# Patient Record
Sex: FEMALE | Race: WHITE | NOT HISPANIC OR LATINO | Employment: OTHER | URBAN - NONMETROPOLITAN AREA
[De-identification: names, ages, dates, MRNs, and addresses within clinical notes are randomized per-mention and may not be internally consistent; named-entity substitution may affect disease eponyms.]

---

## 2019-01-31 ENCOUNTER — HOSPITAL ENCOUNTER (OUTPATIENT)
Dept: BONE DENSITY | Facility: HOSPITAL | Age: 64
End: 2019-01-31

## 2019-02-25 ENCOUNTER — HOSPITAL ENCOUNTER (OUTPATIENT)
Dept: BONE DENSITY | Facility: HOSPITAL | Age: 64
Discharge: HOME | End: 2019-02-25
Admitting: INTERNAL MEDICINE

## 2019-02-25 DIAGNOSIS — Z78.0 ASYMPTOMATIC MENOPAUSAL STATE: ICD-10-CM

## 2019-02-25 PROCEDURE — 77080 DXA BONE DENSITY AXIAL: CPT | Performed by: RADIOLOGY

## 2019-02-25 PROCEDURE — 77080 DXA BONE DENSITY AXIAL: CPT

## 2019-08-26 ENCOUNTER — OFFICE VISIT (OUTPATIENT)
Dept: PSYCHIATRY | Facility: CLINIC | Age: 64
End: 2019-08-26

## 2019-08-26 DIAGNOSIS — F41.1 GENERALIZED ANXIETY DISORDER: ICD-10-CM

## 2019-08-26 DIAGNOSIS — F33.1 MAJOR DEPRESSIVE DISORDER, RECURRENT EPISODE, MODERATE (HCC): Primary | ICD-10-CM

## 2019-08-26 PROCEDURE — 90791 PSYCH DIAGNOSTIC EVALUATION: CPT | Performed by: SOCIAL WORKER
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
## 2019-09-18 ENCOUNTER — OFFICE VISIT (OUTPATIENT)
Dept: PSYCHIATRY | Facility: CLINIC | Age: 64
End: 2019-09-18

## 2019-09-18 DIAGNOSIS — F41.1 GENERALIZED ANXIETY DISORDER: ICD-10-CM

## 2019-09-18 DIAGNOSIS — F33.1 MAJOR DEPRESSIVE DISORDER, RECURRENT EPISODE, MODERATE (HCC): Primary | ICD-10-CM

## 2019-09-18 PROCEDURE — 90837 PSYTX W PT 60 MINUTES: CPT | Performed by: SOCIAL WORKER

## 2019-09-18 NOTE — PROGRESS NOTES
"    PROGRESS NOTE  Data:  Anita Gorman came in 9/18/2019 for her regularly scheduled therapy session, with Carla Merritt, \[facility]\""[facility] from 445 pm to 540.  Patient shares that her mood was mostly positive since last being seen until the last week when patient admits she became much more focused on her 's behaviors.  She is shares several specifics of questionable behavior that he is doing.  The patient shares that she has been exploring interactions types and she has decided that she is the problem in the relationship.  Patient was tearful while she shared her emotional hurt and pain of her 's dishonesty.  Shares that Patient also shares that  has an appointment to see the psychiatrist Dr. Orona and she is hopeful that this will be better for her.    Clinical maneuvering/Intervention:  Assisted patient in processing above session content; acknowledged and normalized patient’s thoughts, feelings, and concerns about herself and her marrage. Allowed patient to freely discuss issues without interruption or judgment. Provided safe, confidential environment to facilitate the development of positive therapeutic relationship and encourage open, honest communication. Exploring with the patient what makes her think that it is her fear of abandonment that is creating all the problems in the marriage.  Assisted the patient to list what are the facts.  As she completes this she realizes that she is not overreacting and she does feel like people who she loved left her.  Again using what are the facts as she challenges the negative thoughts.  She admits that while it is true her parents left her isidro in life, her Uncle passed away and did not intentionally leave her.  She shares that she \"should/Shouldnt\".... As her main thinking issue.  Praised patient for increased insight.  Continued to use CBT to assist her in challenging the negative thought patterns.  Assisted the patient to make a plan " for what she can start doing to help herself feel better-focusing on herself.  She is encouraged to do these things, bible study, [employer], women's fellowship etc.  Encouraged pt the importance of keeping all appointments and taking medications as prescribed if prescribed  and calling with any questions or concerns.    Assisted patient in identifying risk factors which would indicate the need for higher level of care including thoughts to harm self or others and/or self-harming behavior and encouraged patient to contact this office, call 911, or present to the nearest emergency room should any of these events occur. Discussed crisis intervention services and means to access.  Patient adamantly and convincingly denies current suicidal or homicidal ideation or perceptual disturbance.    Assessment     Diagnoses and all orders for this visit:    Major depressive disorder, recurrent episode, moderate (CMS/HCC)    Generalized anxiety disorder        Patient presents for session on time, clean and casually dressed with depressed/anxious mood and congruent affect. No evidence of intoxication, withdrawal, or perceptual disturbance. Patient appears to maintain relative stability as compared to their baseline.  However, patient continues to struggle with anxiety/depression with extreme, harsh negative self talk which continues to cause impairment in important areas of functioning.  A result, they can be reasonably expected to continue to benefit from treatment and would likely be at increased risk for decompensation otherwise Association’s intact, abstraction intact. Thought process is linear and logical. Speech is clear and coherent. Patient is oriented to person, place, and time. Attention and concentration fair. Insight and judgment fair. Patient reports no current suicidal or homicidal ideation. Patient appears cooperative and agreeable to treatment and appears to begin to develop rapport. Patient does not appear to be  malingering.          Psychological ROS: positive for - anxiety, depression and obsessive thoughts    Mental Status Exam:   Hygiene:   good  Cooperation:  Cooperative  Eye Contact:  Good  Psychomotor Behavior:  Restless  Affect:  Appropriate  Mood: anxious  Speech:  Normal  Thought Process:  Linear  Thought Content:  Normal  Suicidal:  None  Homicidal:  None  Hallucinations:  None  Delusion:  None  Memory:  Intact  Orientation:  Person, Place, Time and Situation  Reliability:  good  Insight:  Good  Judgement:  Fair  Impulse Control:  Good  Physical/Medical Issues:  No      Patient's Support Network Includes:  , daughter and extended family    Progress toward goal: Not at goal    Functional Status: Moderate impairment     Prognosis: Good with Ongoing Treatment          Future Appointments       Provider Department Center    9/23/2019 10:00 AM Zeina Ortiz APRN [facility]     10/29/2019 2:00 PM Carla Meadows LCSW [facility]     11/13/2019 2:00 PM Carla Meadows LCSW [facility]           Patient will have at least monthly outpatient psychotherapy sessions or earlier if symptoms worsen or fail to improve and pharmacotherapy as scheduled with the focus on improved functioning and coping skills, maintaining stability and avoiding decompensation and the need for a higher level of care.      Patient will adhere to medication regimen as prescribed and report any side effects. Patient will contact this office, call 911 or present to the nearest emergency room should suicidal or homicidal ideations occur. Provide Cognitive Behavioral Therapy and Solution Focused Therapy to improve functioning, maintain stability, and avoid decompensation and the need for higher level of care.     Carla Merritt LCSW, [facility]

## 2019-09-19 NOTE — TREATMENT PLAN
Multi-Disciplinary Problems (from Behavioral Health Treatment Plan)    Active Problems     Problem: Anxiety  Start Date: 09/19/19    Problem Details:  The patient self-scales this problem as a 6 with 10 being the worst.       Goal Priority Start Date Expected End Date End Date    Patient will develop and implement behavioral and cognitive strategies to reduce anxiety and irrational fears. -- 09/19/19 09/18/20 --    Goal Details:  Progress toward goal:  Not appropriate to rate progress toward goal since this is the initial treatment plan.       Goal Intervention Frequency Start Date End Date    Help patient explore past emotional issues in relation to present anxiety. PRN 09/19/19 --    Intervention Details:  Duration of treatment until until discharged.       Goal Intervention Frequency Start Date End Date    Help patient develop an awareness of their cognitive and physical responses to anxiety. PRN 09/19/19 --    Intervention Details:  Duration of treatment until until remission of symptoms.             Problem: Co-Dependency  Start Date: 09/19/19    Problem Details:  The patient self-scales this problem as a 8 with 10 being the worst.       Goal Priority Start Date Expected End Date End Date    Patient will demonstrate ability to set healthy boundaries and meet own needs within a relationship. -- 09/19/19 09/18/20 --    Goal Details:  Progress toward goal:  Not appropriate to rate progress toward goal since this is the initial treatment plan.       Goal Intervention Frequency Start Date End Date    Assist patient in identifying enabling behaviors and healthy boundaries within relationships. PRN 09/19/19 --    Intervention Details:  Duration of treatment until until discharged.             Problem: Depression  Start Date: 09/19/19    Problem Details:  The patient self-scales this problem as a 4 with 10 being the worst.       Goal Priority Start Date Expected End Date End Date    Patient will demonstrate the ability  to initiate new constructive life skills outside of sessions on a consistent basis. -- 09/19/19 09/18/20 --    Goal Details:  Progress toward goal:  Not appropriate to rate progress toward goal since this is the initial treatment plan.       Goal Intervention Frequency Start Date End Date    Assist patient in setting attainable activities of daily living goals. PRN 09/19/19 --    Goal Intervention Frequency Start Date End Date    Provide education about depression PRN 09/19/19 --    Intervention Details:  Duration of treatment until until remission of symptoms.       Goal Intervention Frequency Start Date End Date    Assist patient in developing healthy coping strategies. PRN 09/19/19 --    Intervention Details:  Duration of treatment until until remission of symptoms.                          I have discussed and reviewed this treatment plan with the patient.

## 2019-09-23 ENCOUNTER — OFFICE VISIT (OUTPATIENT)
Dept: PSYCHIATRY | Facility: CLINIC | Age: 64
End: 2019-09-23

## 2019-09-23 VITALS
DIASTOLIC BLOOD PRESSURE: 72 MMHG | BODY MASS INDEX: 27.59 KG/M2 | WEIGHT: 165.6 LBS | HEART RATE: 82 BPM | HEIGHT: 65 IN | SYSTOLIC BLOOD PRESSURE: 132 MMHG

## 2019-09-23 DIAGNOSIS — F41.1 GENERALIZED ANXIETY DISORDER: ICD-10-CM

## 2019-09-23 DIAGNOSIS — Z79.899 MEDICATION MANAGEMENT: ICD-10-CM

## 2019-09-23 DIAGNOSIS — F33.1 MAJOR DEPRESSIVE DISORDER, RECURRENT EPISODE, MODERATE (HCC): Primary | ICD-10-CM

## 2019-09-23 DIAGNOSIS — F51.05 INSOMNIA DUE TO OTHER MENTAL DISORDER: ICD-10-CM

## 2019-09-23 DIAGNOSIS — F99 INSOMNIA DUE TO OTHER MENTAL DISORDER: ICD-10-CM

## 2019-09-23 LAB
AMPHETAMINE CUT-OFF: NORMAL
BENZODIAZIPINE CUT-OFF: NORMAL
BUPRENORPHINE CUT-OFF: NORMAL
COCAINE CUT-OFF: NORMAL
EXTERNAL AMPHETAMINE SCREEN URINE: NEGATIVE
EXTERNAL BENZODIAZEPINE SCREEN URINE: NEGATIVE
EXTERNAL BUPRENORPHINE SCREEN URINE: NEGATIVE
EXTERNAL COCAINE SCREEN URINE: NEGATIVE
EXTERNAL MDMA: NEGATIVE
EXTERNAL METHADONE SCREEN URINE: NEGATIVE
EXTERNAL METHAMPHETAMINE SCREEN URINE: NEGATIVE
EXTERNAL OPIATES SCREEN URINE: NEGATIVE
EXTERNAL OXYCODONE SCREEN URINE: NEGATIVE
EXTERNAL THC SCREEN URINE: NEGATIVE
MDMA CUT-OFF: NORMAL
METHADONE CUT-OFF: NORMAL
METHAMPHETAMINE CUT-OFF: NORMAL
OPIATES CUT-OFF: NORMAL
OXYCODONE CUT-OFF: NORMAL
THC CUT-OFF: NORMAL

## 2019-09-23 PROCEDURE — 90792 PSYCH DIAG EVAL W/MED SRVCS: CPT | Performed by: NURSE PRACTITIONER

## 2019-09-23 RX ORDER — FLUOXETINE 10 MG/1
10 CAPSULE ORAL DAILY
Qty: 7 CAPSULE | Refills: 0 | Status: SHIPPED | OUTPATIENT
Start: 2019-09-23 | End: 2019-09-30

## 2019-09-23 RX ORDER — MONTELUKAST SODIUM 10 MG/1
TABLET ORAL
COMMUNITY
Start: 2019-07-03

## 2019-09-23 RX ORDER — CHOLECALCIFEROL (VITAMIN D3) 125 MCG
5 CAPSULE ORAL
COMMUNITY

## 2019-09-23 RX ORDER — DOCUSATE CALCIUM 240 MG
240 CAPSULE ORAL 2 TIMES DAILY
COMMUNITY

## 2019-09-23 RX ORDER — ROSUVASTATIN CALCIUM 40 MG/1
TABLET, COATED ORAL
COMMUNITY
Start: 2019-08-07

## 2019-09-23 RX ORDER — ESTRADIOL 0.05 MG/D
FILM, EXTENDED RELEASE TRANSDERMAL
COMMUNITY
Start: 2019-09-21

## 2019-09-23 RX ORDER — FLUOXETINE HYDROCHLORIDE 20 MG/1
20 CAPSULE ORAL DAILY
Qty: 23 CAPSULE | Refills: 0 | Status: SHIPPED | OUTPATIENT
Start: 2019-09-23 | End: 2019-10-09 | Stop reason: SDUPTHER

## 2019-09-23 RX ORDER — DICLOFENAC SODIUM 75 MG/1
TABLET, DELAYED RELEASE ORAL
COMMUNITY
Start: 2019-08-10 | End: 2019-11-06

## 2019-09-23 RX ORDER — TRAZODONE HYDROCHLORIDE 50 MG/1
25-50 TABLET ORAL NIGHTLY PRN
Qty: 30 TABLET | Refills: 0 | Status: SHIPPED | OUTPATIENT
Start: 2019-09-23 | End: 2019-10-09 | Stop reason: SDUPTHER

## 2019-09-23 NOTE — PROGRESS NOTES
Subjective   Anita Gorman is a 64 y.o. female who is here today seeing me for the first time for medication management after being referred by Carla DEGROOT here at the [facility].     Chief Complaint:  Anxiety and depression    History of Present Illness  Patient comes in today seeing me for the first time after being referred by Carla DEGROOT due to anxiety and depression. Patient reports that she has been suffering from anxiety and depression on and off for the last 8 years and relates most of this to her  and his infidelity.  Patient reports that she has frequent mood changes along with irritability and agitation.  Patient reports that she stays angry as where he will as fearful that incidents will occur again.  Patient reports that she used to cry when she would get upset and anymore she is staying angry and irritable.  Patient does report racing thoughts that mostly occur at nighttime.  Patient states that in the last 8 years she has not been sleeping well as she states she is tried melatonin 5 mg and has been on for several years now and it helps roughly 5 out of the 7 nights a week.  Patient states that even with the melatonin she is only averaging 5 hours but does not feel rested.  Patient states that other times she will not be able to go to sleep where she will be up and down throughout the night and have difficulty sleeping and they did take naps throughout the day.  Patient reports that her appetite is up and down based upon her mood.  She states that she is currently on weight watchers now as she is 13 pounds heavier than what she prefers.  Patient states that she was seeing a counselor in the past but due to her insurance change she had to stop seeing the counselor for several years and is now back to seeing Carla.  Patient reports that she is more forgetful and more tired lately than she has been previously.  Patient states that she has saw her PCP Dr. Richards in the past and believes  "that she has been on sertraline as well as Escitalopram but cannot be for certain.  Patient did not recall being on Pristiq or Viibryd.  Patient had a hard time recalling if anything was effective for her.  Patient also began crying as she stated that due to her spiritual believes she feels guilty over not being able to overcome these thoughts spiritually and feels guilty with taking medication. The patient reports depressive symptoms including depressed mood, insomnia, decreased appetite, feelings of guilt, feelings of worthlessness and low energy, and have caused impairment in important areas of functioning.  Depression rated 8/10 with 10 being the worst. The patient reports the following symptoms of anxiety: constant anxiety/worry, restlessness/on edge, difficulty concentrating, irritability, sleep disturbance and anxiety causes distress/impairment in important areas of functioning and have caused impairment in important areas of functioning.  She currently rates her anxiety as 7-8 on a scale of 0-10 with 10 being the worst.  Patient reports that she cannot seem to shake the \"what if\" factor in her head which is mostly related to her .  She reports that counseling has been good for her but is still having the ongoing anxiety and depression along with the mood changes.  Patient adamantly denies any SI or HI or any past hospitalization or attempts.  Patient denies any auditory visual hallucinations.      The following portions of the patient's history were reviewed and updated as appropriate: allergies, current medications, past family history, past medical history, past social history, past surgical history and problem list.    Review of Systems   Psychiatric/Behavioral: Positive for agitation, dysphoric mood and sleep disturbance. The patient is nervous/anxious.    All other systems reviewed and are negative.      Objective   Physical Exam   Constitutional: She appears well-developed and well-nourished. " "  Psychiatric: Her speech is normal. Judgment and thought content normal. Her mood appears anxious. She is agitated. Cognition and memory are normal. She exhibits a depressed mood.   Nursing note and vitals reviewed.    Blood pressure 132/72, pulse 82, height 165.1 cm (65\"), weight 75.1 kg (165 lb 9.6 oz). Body mass index is 27.56 kg/m².      No Known Allergies    Recent Results (from the past 2016 hour(s))   [facility] Drug Screen    Collection Time: 09/23/19  9:41 AM   Result Value Ref Range    External Amphetamine Screen Urine Negative     Amphetamine Cut-Off 1000mg/ml     External Benzodiazepine Screen Urine Negative     Benzodiazipine Cut-Off 300mg/ml     External Cocaine Screen Urine Negative     Cocaine Cut-Off 300mg/ml     External THC Screen Urine Negative     THC Cut-Off 50mg/ml     External Methadone Screen Urine Negative     Methadone Cut-Off 300mg/ml     External Methamphetamine Screen Urine Negative     Methamphetamine Cut-Off 1000mg/ml     External Oxycodone Screen Urine Negative     Oxycodone Cut-Off 100mg/ml     External Buprenorphine Screen Urine Negative     Buprenorphine Cut-Off 10mg/ml     External MDMA Negative     MDMA Cut-Off 500mg/ml     External Opiates Screen Urine Negative     Opiates Cut-Off 300mg/ml        Current Medications:   Current Outpatient Medications   Medication Sig Dispense Refill   • diclofenac (VOLTAREN) 75 MG EC tablet      • docusate calcium (SURFAK) 240 MG capsule Take 240 mg by mouth 2 (Two) Times a Day.     • esomeprazole (nexIUM) 20 MG capsule      • estradiol (MINIVELLE, VIVELLE-DOT) 0.05 MG/24HR patch      • melatonin 5 MG tablet tablet Take 5 mg by mouth.     • montelukast (SINGULAIR) 10 MG tablet      • rosuvastatin (CRESTOR) 40 MG tablet      • FLUoxetine (PROzac) 10 MG capsule Take 1 capsule by mouth Daily for 7 days. 7 capsule 0   • FLUoxetine (PROzac) 20 MG capsule Take 1 capsule by mouth Daily for 23 days. 23 capsule 0   • traZODone (DESYREL) 50 MG tablet Take " 0.5-1 tablets by mouth At Night As Needed for Sleep. 30 tablet 0     No current facility-administered medications for this visit.        Mental Status Exam:   Hygiene:   good  Cooperation:  Cooperative  Eye Contact:  Good  Psychomotor Behavior:  Appropriate  Affect:  Appropriate  Hopelessness: Denies  Speech:  Normal  Thought Process:  Goal directed and Linear  Thought Content:  Normal  Suicidal:  None  Homicidal:  None  Hallucinations:  None  Delusion:  None  Memory:  Intact  Orientation:  Person, Place, Time and Situation  Reliability:  good  Insight:  Fair  Judgement:  Good  Impulse Control:  Good  Physical/Medical Issues:  Yes Menopause    Assessment/Plan   Diagnoses and all orders for this visit:    Major depressive disorder, recurrent episode, moderate (CMS/HCC)  -     FLUoxetine (PROzac) 10 MG capsule; Take 1 capsule by mouth Daily for 7 days.  -     FLUoxetine (PROzac) 20 MG capsule; Take 1 capsule by mouth Daily for 23 days.    Generalized anxiety disorder  -     FLUoxetine (PROzac) 10 MG capsule; Take 1 capsule by mouth Daily for 7 days.  -     FLUoxetine (PROzac) 20 MG capsule; Take 1 capsule by mouth Daily for 23 days.    Insomnia due to other mental disorder  -     traZODone (DESYREL) 50 MG tablet; Take 0.5-1 tablets by mouth At Night As Needed for Sleep.    Medication management  -     [facility] Drug Screen        Discused medications options in detail as well as past psychiatric and medical history as well as past social history; also discussed past medications as well as any side effects and current symptoms as the patient is new to me seeing me for the first time for medication management..      Begin Prozac 10 mg for 1 week if no side effects then increase to 20 mg daily for depression and anxiety.  Begin trazodone 25-50 mg at night as needed for sleep.  Patient was hesitant and fearful of medication explained the side effects as well as risk and benefits to the patient in detail.  Informed patient  that we would start with a low dose to ensure no side effects or if side effects were to occur to contact the [facility] immediately stop the Prozac and begin another SSRI.  Also informed the patient to not take the trazodone after 10 PM as it can cause daytime drowsiness and fatigue and if it were to continue to do so contact the [facility] as we would try an alternative for sleep.  Discussed the risks, beneefits, and side effects of the medications; patient ackowledged and verbally consentedd.  Patient is aware to call the [facility] with any worsening of symptoms.  Patient is agreeable to call 911 or go to the nearest ER should he/she begin having SI/HI.  Patient was informed that if she had any worsening symptoms of depression and anxiety or frequent mood changes with any SI to immediately stop Prozac contact the [facility] or go to the ER patient agreed.  Patient states that she will be on a cruise in a few weeks so will follow-up in 3 weeks instead to assess any side effects and effectiveness of medication.  Highly encouraged the patient to continue with her therapy for coping skills related to her  and her depression and anxiety with Carla here at the [facility].         Errors in dictation may reflect use of voice recognition software and not all errors in transcription may have been detected prior to signing.

## 2019-10-09 ENCOUNTER — OFFICE VISIT (OUTPATIENT)
Dept: PSYCHIATRY | Facility: CLINIC | Age: 64
End: 2019-10-09

## 2019-10-09 VITALS
BODY MASS INDEX: 27.56 KG/M2 | DIASTOLIC BLOOD PRESSURE: 69 MMHG | HEART RATE: 71 BPM | SYSTOLIC BLOOD PRESSURE: 119 MMHG | HEIGHT: 65 IN

## 2019-10-09 DIAGNOSIS — F41.1 GENERALIZED ANXIETY DISORDER: ICD-10-CM

## 2019-10-09 DIAGNOSIS — F99 INSOMNIA DUE TO OTHER MENTAL DISORDER: ICD-10-CM

## 2019-10-09 DIAGNOSIS — F51.05 INSOMNIA DUE TO OTHER MENTAL DISORDER: ICD-10-CM

## 2019-10-09 DIAGNOSIS — F33.1 MAJOR DEPRESSIVE DISORDER, RECURRENT EPISODE, MODERATE (HCC): Primary | ICD-10-CM

## 2019-10-09 PROCEDURE — 99213 OFFICE O/P EST LOW 20 MIN: CPT | Performed by: NURSE PRACTITIONER

## 2019-10-09 RX ORDER — TRAZODONE HYDROCHLORIDE 50 MG/1
25-50 TABLET ORAL NIGHTLY PRN
Qty: 30 TABLET | Refills: 0 | Status: SHIPPED | OUTPATIENT
Start: 2019-10-09 | End: 2019-11-06 | Stop reason: SDUPTHER

## 2019-10-09 RX ORDER — FLUOXETINE HYDROCHLORIDE 40 MG/1
40 CAPSULE ORAL DAILY
Qty: 30 CAPSULE | Refills: 0 | Status: SHIPPED | OUTPATIENT
Start: 2019-10-09 | End: 2019-11-06 | Stop reason: SDUPTHER

## 2019-10-09 NOTE — PROGRESS NOTES
Subjective   Anita Gorman is a 64 y.o. female who is here today for medication management follow up.    Chief Complaint:  Follow-up for depression and anxiety     History of Present Illness  Patient presents today noting that going to argument with her  last Wednesday over his sexual dysfunction as well as his related to drug addiction which he is in denial of.  Patient states that she started having increased anxiety over the situation since last Wednesday when this occurred.  Patient feels that her anxiety and depression is more situational related to her  due to his addictions.  Patient stated that she has not noticed any difference with the medications that she is still get having anger and negativity towards herself and others.  Patient reports her anxiety and depression are an 8 out of 10 on a scale of 0-10 with 10 being the worst.  Patient feels that the situation also brought more of her depression and anxiety on.  Patient reports though that the trazodone has been helpful as it helps her get to sleep and calms her down as she stays asleep and is getting roughly 7-8 hours at night.  Patient reports that her appetite is good.  Any side effects to the current medications.  She had stated that she noticed some red in her stool this morning but states she has had spaghetti 2 days in a row encouraged her to continue monitoring and if it increases to contact her PCP.  Patient feels as though if her mood is up and down and she is angry but is most related towards her  and situational.  Patient reports that her anxiety can be situational at times.  Patient notes that sometimes she wants to cry but cannot which also makes it worse for her.  Patient did state that she was looking at her records and noticed that Dr. Richards had put her on paroxetine and sertraline in the past but she was not sure if it was beneficial or not as it has been sometime ago.  Patient denied any side effects to the  "current medication but states she has not noticed any difference but it has not been a full 4 weeks yet since the patient goes on a cruise next week.  She adamantly denies any SI or HI.  Patient denies any auditory or visual hallucinations.        The following portions of the patient's history were reviewed and updated as appropriate: allergies, current medications, past family history, past medical history, past social history, past surgical history and problem list.    Review of Systems   Psychiatric/Behavioral: Positive for agitation and dysphoric mood. The patient is nervous/anxious.    All other systems reviewed and are negative.      Objective   Physical Exam   Constitutional: She appears well-developed and well-nourished.   Psychiatric: Her speech is normal. Judgment and thought content normal. Her mood appears anxious. She is agitated. Cognition and memory are normal. She exhibits a depressed mood.   Nursing note and vitals reviewed.    Blood pressure 119/69, pulse 71, height 165.1 cm (65\").    No Known Allergies    Recent Results (from the past 2016 hour(s))   [facility] Drug Screen    Collection Time: 09/23/19  9:41 AM   Result Value Ref Range    External Amphetamine Screen Urine Negative     Amphetamine Cut-Off 1000mg/ml     External Benzodiazepine Screen Urine Negative     Benzodiazipine Cut-Off 300mg/ml     External Cocaine Screen Urine Negative     Cocaine Cut-Off 300mg/ml     External THC Screen Urine Negative     THC Cut-Off 50mg/ml     External Methadone Screen Urine Negative     Methadone Cut-Off 300mg/ml     External Methamphetamine Screen Urine Negative     Methamphetamine Cut-Off 1000mg/ml     External Oxycodone Screen Urine Negative     Oxycodone Cut-Off 100mg/ml     External Buprenorphine Screen Urine Negative     Buprenorphine Cut-Off 10mg/ml     External MDMA Negative     MDMA Cut-Off 500mg/ml     External Opiates Screen Urine Negative     Opiates Cut-Off 300mg/ml        Current Medications: "   Current Outpatient Medications   Medication Sig Dispense Refill   • diclofenac (VOLTAREN) 75 MG EC tablet      • docusate calcium (SURFAK) 240 MG capsule Take 240 mg by mouth 2 (Two) Times a Day.     • esomeprazole (nexIUM) 20 MG capsule      • estradiol (MINIVELLE, VIVELLE-DOT) 0.05 MG/24HR patch      • FLUoxetine (PROzac) 40 MG capsule Take 1 capsule by mouth Daily for 30 days. 30 capsule 0   • melatonin 5 MG tablet tablet Take 5 mg by mouth.     • montelukast (SINGULAIR) 10 MG tablet      • rosuvastatin (CRESTOR) 40 MG tablet      • traZODone (DESYREL) 50 MG tablet Take 0.5-1 tablets by mouth At Night As Needed for Sleep. 30 tablet 0     No current facility-administered medications for this visit.        Mental Status Exam:   Hygiene:   good  Cooperation:  Cooperative  Eye Contact:  Good  Psychomotor Behavior:  Appropriate  Affect:  Appropriate  Hopelessness: Denies  Speech:  Normal  Thought Process:  Goal directed  Thought Content:  Normal  Suicidal:  None  Homicidal:  None  Hallucinations:  None  Delusion:  None  Memory:  Intact  Orientation:  Person, Place, Time and Situation  Reliability:  good  Insight:  Fair  Judgement:  Good  Impulse Control:  Good  Physical/Medical Issues:  No     Assessment/Plan   Diagnoses and all orders for this visit:    Major depressive disorder, recurrent episode, moderate (CMS/HCC)  -     FLUoxetine (PROzac) 40 MG capsule; Take 1 capsule by mouth Daily for 30 days.    Insomnia due to other mental disorder  -     traZODone (DESYREL) 50 MG tablet; Take 0.5-1 tablets by mouth At Night As Needed for Sleep.    Generalized anxiety disorder  -     FLUoxetine (PROzac) 40 MG capsule; Take 1 capsule by mouth Daily for 30 days.        Discused medications options as well as any side effects.    Patient denies any side effects to the Prozac so will increase to 40 mg daily for ongoing depression and anxiety.  Encourage patient that if she had any worsening symptoms of depression or anxiety to  immediately stop the Prozac and contact the [place] [facility] patient verbally agreed.  Continue trazodone 25-50 mg at night as needed for sleep as the patient reports it has been beneficial.  Encourage the patient at the next visit if she has not noticed any difference with the medication and will switch to another SSRI or SNRI. Discussed the risks, beneefits, and side effects of the medications; patient ackowledged and verbally consentedd.  Patient is aware to call the [facility] with any worsening of symptoms.  Patient is agreeable to call 911 or go to the nearest ER should he/she begin having SI/HI.    Prognosis: Guarded dependent on medication/follow up and treatment plan compliance.  Functionality: pt having significant impairment in important areas of daily functioning.  Patient will follow-up in 4 weeks highly encouraged patient that if she had any worsening symptoms to contact the [facility] for sooner appointment patient verbally agreed.  Encouraged patient to keep appointments with Carla DEGROOT here at the [facility] to work on coping skills as well as anxiety and depression.      Errors in dictation may reflect use of voice recognition software and not all errors in transcription may have been detected prior to signing.

## 2019-10-29 ENCOUNTER — OFFICE VISIT (OUTPATIENT)
Dept: PSYCHIATRY | Facility: CLINIC | Age: 64
End: 2019-10-29

## 2019-10-29 DIAGNOSIS — F33.1 MAJOR DEPRESSIVE DISORDER, RECURRENT EPISODE, MODERATE (HCC): Primary | ICD-10-CM

## 2019-10-29 DIAGNOSIS — F41.1 GENERALIZED ANXIETY DISORDER: ICD-10-CM

## 2019-10-29 PROCEDURE — 90834 PSYTX W PT 45 MINUTES: CPT | Performed by: SOCIAL WORKER

## 2019-10-29 NOTE — PROGRESS NOTES
"    PROGRESS NOTE  Data:  Anita Gorman came in 10/29/2019 for her regularly scheduled therapy session, with Carla Merritt, LCSWLCADSHAUN from 1:50 pm to 2:45 pm.  Pt. Reports that she is just returning from a 2 [place] vacation in [place]/  She shares that she is frustrated and angry at current situation but reports that she is not as depressed as before medications.  Shares at length about relationship and what she is realizing about herself.    Clinical Maneuvering/Intervention:  Assisted patient in processing above session content; acknowledged and normalized patient’s thoughts, feelings, and concerns. Allowed patient to freely discuss issues without interruption or judgment. Provided safe, confidential environment to facilitate the development of positive therapeutic relationship and encourage open, honest communication.   Continued to discuss 7 foundations of mindfulness and how to practice these skills to improve her self satisfaction.  Continued to work on focusing on herself and her own self growth right now and using \"Acceptance\" of what is so that she can continue to grow and learn ways to feel better.  Using CBT to continue to challenge thought distortions.  Encouraged pt the importance of keeping all appointments and taking medications as prescribed if prescribed  and calling with any questions or concerns.    Assisted patient in identifying risk factors which would indicate the need for higher level of care including thoughts to harm self or others and/or self-harming behavior and encouraged patient to contact this office, call 911, or present to the nearest emergency room should any of these events occur. Discussed crisis intervention services and means to access.  Patient adamantly and convincingly denies current suicidal or homicidal ideation or perceptual disturbance.    Assessment     Diagnoses and all orders for this visit:    Major depressive disorder, recurrent episode, moderate " (CMS/[facility])    Generalized anxiety disorder        Patient presents for session on time, clean and casually dressed with depressed/anxious mood and congruent affect. No evidence of intoxication, withdrawal, or perceptual disturbance. Patient appears to maintain relative stability as compared to their baseline.  However, patient continues to struggle with depression and anxiety which continues to cause impairment in important areas of functioning.  A result, they can be reasonably expected to continue to benefit from treatment and would likely be at increased risk for decompensation otherwise. Association’s intact, abstraction intact. Thought process is linear and logical. Speech is clear and coherent. Patient is oriented to person, place, and time. Attention and concentration fair. Insight and judgment fair. Patient reports no current suicidal or homicidal ideation. Patient appears cooperative and agreeable to treatment and appears to begin to develop rapport. Patient does not appear to be malingering.          Psychological ROS: positive for - anxiety and depression    Mental Status Exam:   Hygiene:   good  Cooperation:  Cooperative  Eye Contact:  Good  Psychomotor Behavior:  Restless  Affect:  Full range  Mood: euthymic  Speech:  Normal  Thought Process:  Goal directed and Linear  Thought Content:  Normal  Suicidal:  None  Homicidal:  None  Hallucinations:  None  Delusion:  None  Memory:  Intact  Orientation:  Person, Place, Time and Situation  Reliability:  good  Insight:  Fair  Judgement:  Fair  Impulse Control:  Good  Physical/Medical Issues:  No           Patient's Support Network Includes:  , daughter and extended family    Progress toward goal: Not at goal    Functional Status: Moderate impairment     Prognosis: Good with Ongoing Treatment          Future Appointments       Provider Department Center    11/6/2019 12:30 PM Zeina Ortiz APRN [facility]      11/13/2019 2:00 PM Carla Meadows LCSW [facility]     11/26/2019 10:00 AM Carla Meadows LCSW [facility]             Patient will have at least monthly outpatient psychotherapy sessions or earlier if symptoms worsen or fail to improve and pharmacotherapy as scheduled with the focus on improved functioning and coping skills, maintaining stability and avoiding decompensation and the need for a higher level of care.      Patient will adhere to medication regimen as prescribed and report any side effects. Patient will contact this office, call 911 or present to the nearest emergency room should suicidal or homicidal ideations occur. Provide Cognitive Behavioral Therapy and Solution Focused Therapy to improve functioning, maintain stability, and avoid decompensation and the need for higher level of care.     Carla Merritt LCSW,[facility]

## 2019-11-06 ENCOUNTER — OFFICE VISIT (OUTPATIENT)
Dept: PSYCHIATRY | Facility: CLINIC | Age: 64
End: 2019-11-06

## 2019-11-06 VITALS
WEIGHT: 166.6 LBS | SYSTOLIC BLOOD PRESSURE: 119 MMHG | HEIGHT: 65 IN | DIASTOLIC BLOOD PRESSURE: 70 MMHG | HEART RATE: 73 BPM | BODY MASS INDEX: 27.76 KG/M2

## 2019-11-06 DIAGNOSIS — F51.05 INSOMNIA DUE TO OTHER MENTAL DISORDER: ICD-10-CM

## 2019-11-06 DIAGNOSIS — F41.1 GENERALIZED ANXIETY DISORDER: ICD-10-CM

## 2019-11-06 DIAGNOSIS — F99 INSOMNIA DUE TO OTHER MENTAL DISORDER: ICD-10-CM

## 2019-11-06 DIAGNOSIS — F33.1 MAJOR DEPRESSIVE DISORDER, RECURRENT EPISODE, MODERATE (HCC): ICD-10-CM

## 2019-11-06 PROCEDURE — 99213 OFFICE O/P EST LOW 20 MIN: CPT | Performed by: NURSE PRACTITIONER

## 2019-11-06 RX ORDER — IBUPROFEN 600 MG/1
600 TABLET ORAL DAILY
COMMUNITY

## 2019-11-06 RX ORDER — TRAZODONE HYDROCHLORIDE 50 MG/1
25-50 TABLET ORAL NIGHTLY PRN
Qty: 90 TABLET | Refills: 0 | Status: SHIPPED | OUTPATIENT
Start: 2019-11-06 | End: 2020-01-29 | Stop reason: SDUPTHER

## 2019-11-06 RX ORDER — FLUOXETINE HYDROCHLORIDE 40 MG/1
40 CAPSULE ORAL DAILY
Qty: 90 CAPSULE | Refills: 0 | Status: SHIPPED | OUTPATIENT
Start: 2019-11-06 | End: 2020-01-29 | Stop reason: SDUPTHER

## 2019-11-06 NOTE — PROGRESS NOTES
Subjective   Anita Gorman is a 64 y.o. female who is here today for medication management follow up.    Chief Complaint:  Follow-up for depression and anxiety     History of Present Illness   Patient comes in today noting that she has been doing much better since the increase in Prozac.  Patient states that her mood has been better and she is not as anxious and depressed.  Patient states that she was able to go home on her cruise to the [place] and had a wonderful time without any difficulty or concern.  Patient stated that things are going better with her  as they are able to communicate more and things have not been as stressful.  Patient states that she has had a couple of days where she has been upset or felt anxious but was able to overcome it without any serious symptoms and manage well.  Patient currently rates her depression and anxiety a 4-5 on a scale of 0-10 with 10 being the worst.  Patient states that that severe anxiety feeling has gone away significantly for her as she is only had 2 episodes and they have not been as significant.  Patient reports that she is sleeping well getting roughly 7-8 hours of sleep at night.  Patient states that she does not always take the trazodone as she may take melatonin and sleep just well and other nights where she has difficulty she takes 25 mg and does well with her sleep.  Patient states that she is currently back on weight watchers and may be doing Adipex and was wondering about the combination.  Instructed the patient that to watch for her mood in any worsening depression and anxiety but she states she only takes for a short times to help with the weight.  Patient denied any side effects to the current medications.  Patient states that overall things have been going well and she looks to continue therapy and the medication and then wants to taper off.  Instructed patient to at least try for 1 year but we can reevaluate in 6-8 months at tapering if  "she is doing well managing her symptoms.  Patient denied any SI or HI.  Patient denies any auditory visual hallucinations.      The following portions of the patient's history were reviewed and updated as appropriate: allergies, current medications, past family history, past medical history, past social history, past surgical history and problem list.    Review of Systems   Psychiatric/Behavioral: Positive for dysphoric mood. Negative for agitation. The patient is nervous/anxious.        Objective   Physical Exam   Constitutional: She appears well-developed and well-nourished.   Psychiatric: Her speech is normal and behavior is normal. Judgment and thought content normal. Her mood appears anxious. She is not agitated. Cognition and memory are normal. She does not exhibit a depressed mood.   Nursing note and vitals reviewed.    Blood pressure 119/70, pulse 73, height 165.1 cm (65\"), weight 75.6 kg (166 lb 9.6 oz).    No Known Allergies    Recent Results (from the past 2016 hour(s))   [employer] Drug Screen    Collection Time: 09/23/19  9:41 AM   Result Value Ref Range    External Amphetamine Screen Urine Negative     Amphetamine Cut-Off 1000mg/ml     External Benzodiazepine Screen Urine Negative     Benzodiazipine Cut-Off 300mg/ml     External Cocaine Screen Urine Negative     Cocaine Cut-Off 300mg/ml     External THC Screen Urine Negative     THC Cut-Off 50mg/ml     External Methadone Screen Urine Negative     Methadone Cut-Off 300mg/ml     External Methamphetamine Screen Urine Negative     Methamphetamine Cut-Off 1000mg/ml     External Oxycodone Screen Urine Negative     Oxycodone Cut-Off 100mg/ml     External Buprenorphine Screen Urine Negative     Buprenorphine Cut-Off 10mg/ml     External MDMA Negative     MDMA Cut-Off 500mg/ml     External Opiates Screen Urine Negative     Opiates Cut-Off 300mg/ml        Current Medications:   Current Outpatient Medications   Medication Sig Dispense Refill   • docusate calcium " (SURFAK) 240 MG capsule Take 240 mg by mouth 2 (Two) Times a Day.     • esomeprazole (nexIUM) 20 MG capsule      • estradiol (MINIVELLE, VIVELLE-DOT) 0.05 MG/24HR patch      • FLUoxetine (PROzac) 40 MG capsule Take 1 capsule by mouth Daily for 90 days. 90 capsule 0   • ibuprofen (ADVIL,MOTRIN) 600 MG tablet Take 600 mg by mouth Daily.     • melatonin 5 MG tablet tablet Take 5 mg by mouth.     • montelukast (SINGULAIR) 10 MG tablet      • rosuvastatin (CRESTOR) 40 MG tablet      • traZODone (DESYREL) 50 MG tablet Take 1/2 to 1 tablet by mouth At Night As Needed for Sleep. 90 tablet 0     No current facility-administered medications for this visit.        Mental Status Exam:   Hygiene:   good  Cooperation:  Cooperative  Eye Contact:  Good  Psychomotor Behavior:  Appropriate  Affect:  Appropriate  Hopelessness: Denies  Speech:  Normal  Thought Process:  Goal directed  Thought Content:  Normal  Suicidal:  None  Homicidal:  None  Hallucinations:  None  Delusion:  None  Memory:  Intact  Orientation:  Person, Place, Time and Situation  Reliability:  good  Insight:  Fair  Judgement:  Good  Impulse Control:  Good  Physical/Medical Issues:  No     Assessment/Plan   Diagnoses and all orders for this visit:    Insomnia due to other mental disorder  -     traZODone (DESYREL) 50 MG tablet; Take 1/2 to 1 tablet by mouth At Night As Needed for Sleep.    Major depressive disorder, recurrent episode, moderate (CMS/HCC)  -     FLUoxetine (PROzac) 40 MG capsule; Take 1 capsule by mouth Daily for 90 days.    Generalized anxiety disorder  -     FLUoxetine (PROzac) 40 MG capsule; Take 1 capsule by mouth Daily for 90 days.        Discused medications options as well as any side effects.      Continue fluoxetine 40 mg daily for depression and anxiety symptoms as patient reports has been helpful without any side effects.  Continue trazodone 25-50 mg at night as needed for sleep as the patient reports it has been beneficial. Discussed the  risks, beneefits, and side effects of the medications; patient ackowledged and verbally consentedd.  Patient is aware to call the [facility] with any worsening of symptoms.  Patient is agreeable to call 911 or go to the nearest ER should he/she begin having SI/HI.  Discussed with the patient in detail regarding tapering and coming off this medication as she states she is eventually wants to.  Instructed the patient that at least 1 year would be better beneficial for the patient to help with the chemicals in the brain but we would reevaluate in 6 months if she was doing well and continue with therapy and had good coping skills regarding tapering and then discontinuing the fluoxetine patient was agreeable with this plan.  Encourage patient to continue following up with Carla for therapy as well as positive coping skills.    Prognosis: Guarded dependent on medication/follow up and treatment plan compliance.  Functionality: pt showing improvements in important areas of daily functioning regarding depression and anxiety and will continue to be compliant and go to therapy over the next 3 months.   Patient will follow-up in 3 months since she feels stable on her current medication regimen.  Highly encouraged patient that if she had any questions or concerns to contact the [facility] or schedule sooner appointment patient agreed.      Errors in dictation may reflect use of voice recognition software and not all errors in transcription may have been detected prior to signing.

## 2019-11-26 ENCOUNTER — OFFICE VISIT (OUTPATIENT)
Dept: PSYCHIATRY | Facility: CLINIC | Age: 64
End: 2019-11-26

## 2019-11-26 DIAGNOSIS — F41.1 GENERALIZED ANXIETY DISORDER: ICD-10-CM

## 2019-11-26 DIAGNOSIS — F33.1 MAJOR DEPRESSIVE DISORDER, RECURRENT EPISODE, MODERATE (HCC): Primary | ICD-10-CM

## 2019-11-26 PROCEDURE — 90837 PSYTX W PT 60 MINUTES: CPT | Performed by: SOCIAL WORKER

## 2019-11-26 NOTE — PROGRESS NOTES
"    PROGRESS NOTE  Data:  Anita Gorman came in 11/26/2019 for her regularly scheduled therapy session, with Carla Merritt, MIKAYLA, LUIS from 9:55 am to 10:55 am.  Patient shares positive changes in her behavior thoughts and feelings since she has continued to refocus her energies on things that she enjoys.  She has begun being active in [employer] study as well as the book \"it is not supposed to be this way\".  Patient shares that she finds herself remaining angry and frustrated and marital relationship and is working hard to resolve those feelings.  She reports that she is working out regularly and has begun attending [employer] which she enjoys.  Patient shares that she stopped taking trazodone due to constipation and is using melatonin with good results for sleep.  She shares the Prozac has improved her mood but she is unable to cry.  Clinical Maneuvering/Intervention:  Assisted patient in processing above session content; acknowledged and normalized patient’s thoughts, feelings, and concerns. Allowed patient to freely discuss issues without interruption or judgment. Provided safe, confidential environment to facilitate the development of positive therapeutic relationship and encourage open, honest communication. Reviewed information about anxiety and depression.   To new to work with the patient addressing negative self talk significant issues with should statements and personalization and blame Continued to address the patient's \"should treatments\" and \"personalization and blame\".  Discussed patient's anger and resentment and using mindfulness to help manage these intense emotions.  Continue to assist patient in applying adaptive strategies to her cognitive distortions.  Patient reports that she is doing better and is pleased with her progress  Encouraged pt the importance of keeping all appointments and taking medications as prescribed if prescribed  and calling with any questions or " concerns.  Assisted patient in identifying risk factors which would indicate the need for higher level of care including thoughts to harm self or others and/or self-harming behavior and encouraged patient to contact this office, call 911, or present to the nearest emergency room should any of these events occur. Discussed crisis intervention services and means to access.  Patient adamantly and convincingly denies current suicidal or homicidal ideation or perceptual disturbance.    Assessment     Diagnoses and all orders for this visit:    Major depressive disorder, recurrent episode, moderate (CMS/HCC)    Generalized anxiety disorder        Patient presents for session on time, clean and casually dressed with depressed/anxious mood and congruent affect. No evidence of intoxication, withdrawal, or perceptual disturbance. Patient appears to maintain relative stability as compared to their baseline.  However, patient continues to struggle with negative self talk which triggers anger, frustrations and anxiety which continues to cause impairment in important areas of functioning.  A result, they can be reasonably expected to continue to benefit from treatment and would likely be at increased risk for decompensation otherwise. Association’s intact, abstraction intact. Thought process is linear and logical. Speech is clear and coherent. Patient is oriented to person, place, and time. Attention and concentration fair. Insight and judgment fair. Patient reports no current suicidal or homicidal ideation. Patient appears cooperative and agreeable to treatment and appears to begin to develop rapport. Patient does not appear to be malingering.          Psychological ROS: positive for - anxiety, depression and irritability    Mental Status Exam:   Hygiene:   good  Cooperation:  Cooperative  Eye Contact:  Good  Psychomotor Behavior:  Appropriate  Affect:  Appropriate  Mood: euthymic  Speech:  Normal  Thought Process:   Linear  Thought Content:  Normal  Suicidal:  None  Homicidal:  None  Hallucinations:  None  Delusion:  None  Memory:  Intact  Orientation:  Person, Place, Time and Situation  Reliability:  fair  Insight:  Fair  Judgement:  Fair  Impulse Control:  Good  Physical/Medical Issues:  No  acute findings.      Patient's Support Network Includes:  , daughter and extended family    Progress toward goal: Not at goal    Functional Status: Moderate impairment     Prognosis: Good with Ongoing Treatment        Future Appointments       Provider Department Center    12/11/2019 11:00 AM Carla Meadows LCSW [facility]     1/29/2020 10:30 AM Zeina Ortiz APRN [facility]           No Follow-up on file.    Patient will have at least monthly outpatient psychotherapy sessions or earlier if symptoms worsen or fail to improve and pharmacotherapy as scheduled with the focus on improved functioning and coping skills, maintaining stability and avoiding decompensation and the need for a higher level of care.  Patient will adhere to medication regimen as prescribed and report any side effects. Patient will contact this office, call 911 or present to the nearest emergency room should suicidal or homicidal ideations occur. Provide Cognitive Behavioral Therapy and Solution Focused Therapy to improve functioning, maintain stability, and avoid decompensation and the need for higher level of care.     Carla Merritt LCSW,MAIK

## 2019-12-11 ENCOUNTER — OFFICE VISIT (OUTPATIENT)
Dept: PSYCHIATRY | Facility: CLINIC | Age: 64
End: 2019-12-11

## 2019-12-11 DIAGNOSIS — F41.1 GENERALIZED ANXIETY DISORDER: ICD-10-CM

## 2019-12-11 DIAGNOSIS — F33.1 MAJOR DEPRESSIVE DISORDER, RECURRENT EPISODE, MODERATE (HCC): Primary | ICD-10-CM

## 2019-12-11 PROCEDURE — 90837 PSYTX W PT 60 MINUTES: CPT | Performed by: SOCIAL WORKER

## 2019-12-11 NOTE — TREATMENT PLAN
Multi-Disciplinary Problems (from Behavioral Health Treatment Plan)    Active Problems     Problem: Anxiety  Start Date: 12/11/19    Problem Details:  The patient self-scales this problem as a 5 with 10 being the worst.       Goal Priority Start Date Expected End Date End Date    Patient will develop and implement behavioral and cognitive strategies to reduce anxiety and irrational fears. -- 12/11/19 12/11/20 --    Goal Details:  Progress toward goal:  The patient self-scales their progress related to this goal as a 5 with 10 being the worst.       Goal Intervention Frequency Start Date End Date    Help patient explore past emotional issues in relation to present anxiety. PRN 12/11/19 12/11/20    Intervention Details:  Duration of treatment until until remission of symptoms.       Goal Intervention Frequency Start Date End Date    Help patient develop an awareness of their cognitive and physical responses to anxiety. PRN 12/11/19 12/11/20    Intervention Details:  Duration of treatment until until remission of symptoms.             Problem: Co-Dependency  Start Date: 12/11/19    Problem Details:  The patient self-scales this problem as a 7 with 10 being the worst.       Goal Priority Start Date Expected End Date End Date    Patient will demonstrate ability to set healthy boundaries and meet own needs within a relationship. -- 12/11/19 12/11/20 --    Goal Details:  Progress toward goal:  The patient self-scales their progress related to this goal as a 7 with 10 being the worst.       Goal Intervention Frequency Start Date End Date    Assist patient in identifying enabling behaviors and healthy boundaries within relationships. PRN 12/11/19 12/11/20    Intervention Details:  Duration of treatment until until remission of symptoms.             Problem: Depression  Start Date: 12/11/19    Problem Details:  The patient self-scales this problem as a 3 with 10 being the worst.       Goal Priority Start Date Expected End  Date End Date    Patient will demonstrate the ability to initiate new constructive life skills outside of sessions on a consistent basis. -- 12/11/19 12/11/20 --    Goal Details:  Progress toward goal:  The patient self-scales their progress related to this goal as a 3 with 10 being the worst.       Goal Intervention Frequency Start Date End Date    Assist patient in setting attainable activities of daily living goals. PRN 12/11/19 --    Goal Intervention Frequency Start Date End Date    Provide education about depression PRN 12/11/19 12/11/20    Intervention Details:  Duration of treatment until until remission of symptoms.       Goal Intervention Frequency Start Date End Date    Assist patient in developing healthy coping strategies. PRN 12/11/19 12/11/20    Intervention Details:  Duration of treatment until until remission of symptoms.                          I have discussed and reviewed this treatment plan with the patient.

## 2019-12-11 NOTE — PLAN OF CARE
"Patient continues to address anxiety, codependency and depression.  She has begun taking medication which she thinks is helpful.  She has begun to \"step back\" from husbands behaviors.  She has also begun to challenge her negative self talk with beginning success.  Will continue to use CBT, distress tolerance skills, solution focused therapy to assist her in decreasing self scales of emotional distress.  Will use this plan when seen until the time when her symptoms resolve.  Patient is agreeable to the plan.  "

## 2019-12-11 NOTE — PROGRESS NOTES
"  PROGRESS NOTE  Data:  Anita Gorman came in 12/11/2019 for her regularly scheduled therapy session, with Carla Merritt, MIKAYLA,LUIS from 10:55 am to 11:50 am. Patient shares that last week she was difficult where she found herself ruminating about past hurts by .  Patient shares that she \"dosent trust people\".        Clinical Maneuvering/Intervention:  Assisted patient in processing above session content; acknowledged and normalized patient’s thoughts, feelings, and concerns. Allowed patient to freely discuss issues without interruption or judgment. Provided safe, confidential environment to facilitate the development of positive therapeutic relationship and encourage open, honest communication. Introduced 7 foundations of mindfulness  Encouraged pt the importance of keeping all appointments and taking medications as prescribed if prescribed  and calling with any questions or concerns.    Assisted patient in identifying risk factors which would indicate the need for higher level of care including thoughts to harm self or others and/or self-harming behavior and encouraged patient to contact this office, call 911, or present to the nearest emergency room should any of these events occur. Discussed crisis intervention services and means to access.  Patient adamantly and convincingly denies current suicidal or homicidal ideation or perceptual disturbance.  DEPRESSION SYMPTOMS: depressed mood  feelings of worthlessness/guilt  difficulty concentrating  Anxiety-loss of energy, irritability, excessive worry      Assessment     Diagnoses and all orders for this visit:    Major depressive disorder, recurrent episode, moderate (CMS/HCC)    Generalized anxiety disorder        Patient presents for session on time, clean and casually dressed with depressed/anxious mood and congruent affect. No evidence of intoxication, withdrawal, or perceptual disturbance. Patient appears to maintain relative stability " as compared to their baseline.  However, patient continues to struggle with negative self talk and rumination of things that happened in the past which continues to cause impairment in important areas of functioning.  A result, they can be reasonably expected to continue to benefit from treatment and would likely be at increased risk for decompensation otherwise. Association’s intact, abstraction intact. Thought process is linear and logical. Speech is clear and coherent. Patient is oriented to person, place, and time. Attention and concentration fair. Insight and judgment fair. Patient reports no current suicidal or homicidal ideation. Patient appears cooperative and agreeable to treatment and appears to begin to develop rapport. Patient does not appear to be malingering.        anxiety, depression and feeling anxious    Mental Status Exam:   Hygiene:   good  Cooperation:  Cooperative  Eye Contact:  Fair  Psychomotor Behavior:  Appropriate  Affect:  Appropriate  Mood: anxious  Speech:  Normal  Thought Process:  Linear  Thought Content:  Normal  Suicidal:  None  Homicidal:  None  Hallucinations:  None  Delusion:  None  Memory:  Intact  Orientation:  Person, Place, Time and Situation  Reliability:  good  Insight:  Fair  Judgement:  Fair  Impulse Control:  Good  Physical/Medical Issues:  No        Patient's Support Network Includes:  , daughter and extended family    Progress toward goal: Not at goal    Functional Status: Moderate impairment     Prognosis: Good with Ongoing Treatment          No follow-ups on file.    Patient will have at least monthly outpatient psychotherapy sessions or earlier if symptoms worsen or fail to improve and pharmacotherapy as scheduled with the focus on improved functioning and coping skills, maintaining stability and avoiding decompensation and the need for a higher level of care.      Patient will adhere to medication regimen as prescribed and report any side effects. Patient  will contact this office, call 911 or present to the nearest emergency room should suicidal or homicidal ideations occur. Provide Cognitive Behavioral Therapy and Solution Focused Therapy to improve functioning, maintain stability, and avoid decompensation and the need for higher level of care.     Carla Merritt, MIKAYLA,[facility]

## 2020-01-14 ENCOUNTER — OFFICE VISIT (OUTPATIENT)
Dept: PSYCHIATRY | Facility: CLINIC | Age: 65
End: 2020-01-14

## 2020-01-14 DIAGNOSIS — F33.1 MAJOR DEPRESSIVE DISORDER, RECURRENT EPISODE, MODERATE (HCC): Primary | ICD-10-CM

## 2020-01-14 DIAGNOSIS — F41.1 GENERALIZED ANXIETY DISORDER: ICD-10-CM

## 2020-01-14 DIAGNOSIS — F43.10 POST TRAUMATIC STRESS DISORDER (PTSD): ICD-10-CM

## 2020-01-14 PROCEDURE — 90837 PSYTX W PT 60 MINUTES: CPT | Performed by: SOCIAL WORKER

## 2020-01-14 NOTE — PROGRESS NOTES
PROGRESS NOTE  Data:  Anita Gorman came in 1/14/2020 for her regularly scheduled therapy session, with Carla Merritt, MIKAYLA,LUIS from 2:14 am to 3:15 pm.  Patient shares that after the last session she spent weeks agonzing over what to say when she went to the appt with her .  He said no.  She shared that she did not like his sexual deviancy, lust issues,  Disrespect.  Chair she spent a long time thinking about what she wanted to share with .  She shares that she is seeing a change in his behavior and remains hopeful that he will be able to continue making positive progress.    Clinical Maneuvering/Intervention:  Assisted patient in processing above session content; acknowledged and normalized patient’s thoughts, feelings, and concerns. Allowed patient to freely discuss issues without interruption or judgment. Provided safe, confidential environment to facilitate the development of positive therapeutic relationship and encourage open, honest communication.  Continued to discuss issues of codependency and how this exacerbates anxiety and depression patient shares more insight into how focusing on  makes her own recovery more difficult.  Discussed mindfulness techniques and how to apply continuing processing patient's intense emotions.  Patient to continue Bible study and explore online Al-Anon meetings which she is agreeable to look at.  Also suggested the book on boundaries. Encouraged pt the importance of keeping all appointments and taking medications as prescribed if prescribed  and calling with any questions or concerns.  Assisted patient in identifying risk factors which would indicate the need for higher level of care including thoughts to harm self or others and/or self-harming behavior and encouraged patient to contact this office, call 911, or present to the nearest emergency room should any of these events occur. Discussed crisis intervention services and means to  access.  Patient adamantly and convincingly denies current suicidal or homicidal ideation or perceptual disturbance.    Assessment     Diagnoses and all orders for this visit:    Major depressive disorder, recurrent episode, moderate (CMS/HCC)    Post traumatic stress disorder (PTSD)    Generalized anxiety disorder        Patient presents for session on time, clean and casually dressed with depressed/anxious mood and congruent affect. No evidence of intoxication, withdrawal, or perceptual disturbance. Patient appears to maintain relative stability as compared to their baseline.  However, patient continues to struggle with anxiety and depression  which continues to cause impairment in important areas of functioning.  A result, they can be reasonably expected to continue to benefit from treatment and would likely be at increased risk for decompensation otherwise. Association’s intact, abstraction intact. Thought process is linear and logical. Speech is clear and coherent. Patient is oriented to person, place, and time. Attention and concentration fair. Insight and judgment fair. Patient reports no current suicidal or homicidal ideation. Patient appears cooperative and agreeable to treatment and appears to begin to develop rapport. Patient does not appear to be malingering.        ROS: Patient is positive for anxiety, depression, excessive stress and marital problems    Mental Status Exam:   Hygiene:   good  Cooperation:  Cooperative  Eye Contact:  Good  Psychomotor Behavior:  Appropriate  Affect:  Appropriate  Mood: euthymic  Speech:  Normal  Thought Process:  Linear  Thought Content:  Normal  Suicidal:  None  Homicidal:  None  Hallucinations:  None  Delusion:  None  Memory:  Intact  Orientation:  Person, Place, Time and Situation  Reliability:  good  Insight:  Fair  Judgement:  Fair  Impulse Control:  Good  Physical/Medical Issues:  No        Patient's Support Network Includes:  , daughter and extended  family    Progress toward goal: Not at goal    Functional Status: Moderate impairment     Prognosis: Good with Ongoing Treatment          Future Appointments       Provider Department Center    1/29/2020 10:30 AM Zeina Ortiz APRN [facility]     2/11/2020 1:00 PM Carla Meadows LCSW [facility]           Patient will have at least monthly outpatient psychotherapy sessions or earlier if symptoms worsen or fail to improve and pharmacotherapy as scheduled with the focus on improved functioning and coping skills, maintaining stability and avoiding decompensation and the need for a higher level of care.      Patient will adhere to medication regimen as prescribed and report any side effects. Patient will contact this office, call 911 or present to the nearest emergency room should suicidal or homicidal ideations occur. Provide Cognitive Behavioral Therapy and Solution Focused Therapy to improve functioning, maintain stability, and avoid decompensation and the need for higher level of care.     Carla Merritt LCSW, [facility]

## 2020-01-29 ENCOUNTER — OFFICE VISIT (OUTPATIENT)
Dept: PSYCHIATRY | Facility: CLINIC | Age: 65
End: 2020-01-29

## 2020-01-29 VITALS
HEART RATE: 80 BPM | WEIGHT: 167 LBS | BODY MASS INDEX: 27.82 KG/M2 | HEIGHT: 65 IN | SYSTOLIC BLOOD PRESSURE: 139 MMHG | DIASTOLIC BLOOD PRESSURE: 84 MMHG

## 2020-01-29 DIAGNOSIS — F41.1 GENERALIZED ANXIETY DISORDER: ICD-10-CM

## 2020-01-29 DIAGNOSIS — F99 INSOMNIA DUE TO OTHER MENTAL DISORDER: ICD-10-CM

## 2020-01-29 DIAGNOSIS — F51.05 INSOMNIA DUE TO OTHER MENTAL DISORDER: ICD-10-CM

## 2020-01-29 DIAGNOSIS — F33.1 MAJOR DEPRESSIVE DISORDER, RECURRENT EPISODE, MODERATE (HCC): ICD-10-CM

## 2020-01-29 PROCEDURE — 90833 PSYTX W PT W E/M 30 MIN: CPT | Performed by: NURSE PRACTITIONER

## 2020-01-29 PROCEDURE — 99214 OFFICE O/P EST MOD 30 MIN: CPT | Performed by: NURSE PRACTITIONER

## 2020-01-29 RX ORDER — SULFACETAMIDE/PREDNISOLONE 10 %-0.2 %
SUSPENSION, DROPS(FINAL DOSAGE FORM)(ML) OPHTHALMIC (EYE)
COMMUNITY

## 2020-01-29 RX ORDER — FLUOROMETHOLONE 0.1 %
SUSPENSION, DROPS(FINAL DOSAGE FORM)(ML) OPHTHALMIC (EYE)
COMMUNITY
Start: 2019-12-17

## 2020-01-29 RX ORDER — FLUOXETINE HYDROCHLORIDE 40 MG/1
40 CAPSULE ORAL DAILY
Qty: 90 CAPSULE | Refills: 0 | Status: SHIPPED | OUTPATIENT
Start: 2020-01-29 | End: 2020-02-19 | Stop reason: SDUPTHER

## 2020-01-29 RX ORDER — LIDOCAINE HYDROCHLORIDE 20 MG/ML
JELLY TOPICAL
COMMUNITY

## 2020-01-29 RX ORDER — TRAZODONE HYDROCHLORIDE 50 MG/1
25-50 TABLET ORAL NIGHTLY PRN
Qty: 90 TABLET | Refills: 0 | Status: SHIPPED | OUTPATIENT
Start: 2020-01-29 | End: 2020-05-11 | Stop reason: SDUPTHER

## 2020-01-29 RX ORDER — BUPROPION HYDROCHLORIDE 150 MG/1
150 TABLET, EXTENDED RELEASE ORAL 2 TIMES DAILY
Qty: 60 TABLET | Refills: 0 | Status: SHIPPED | OUTPATIENT
Start: 2020-01-29 | End: 2020-02-19 | Stop reason: SDUPTHER

## 2020-01-29 NOTE — PROGRESS NOTES
Subjective   Anita Gorman is a 65 y.o. female who is here today for medication management follow up.    Chief Complaint:  Follow-up for depression and anxiety     History of Present Illness   Patient comes in today noting that she has had a difficult time as her  was recently inpatient for a GI bleed and diagnosed with diverticulosis flareup.  Patient reports that she has had difficulty with sleep because of this but otherwise her sleep has been good as she is getting at least 6 to 7 hours.  Patient states on nights when she has trouble sleeping she will take a quarter of a trazodone and that helps with her sleep.  Patient also notes that she has to see a ocular plasty specialist due to a strong family history of macular degeneration.  Patient denies any side effects to the current medication.  She notes that in some ways her depression and anxiety are less but are still present as she is having more good days and bad days but also notes that she has had a lot of negative thinking as well as anger and irritability which is not happening every day but at least once or twice a week.  She states a lot of this is situational dealing with her current  in the past issues they have had.  She reports that she is in therapy and working with a line on mindful exercises and realizes that she has to let go of the past but is having a difficult time.  Patient notes increased fatigue and has got her vitamin D and B12 checked by her PCP and states that were all within normal limits.  Informed her this may be related to the depression.  Patient noticed that she is trying to watch what she eats but it has been difficult to lose weight at her age.  Patient denies any side effects to the current medications.  Patient reports that other than her depression and anger on various days of the week she feels the medication has been helpful but not in the ways that she thought it would be as she thought it would be more  "helpful and not caring and be able to let go things in the past.  Informed patient that working with therapy as this will be helpful in order to let go and move forward in her life.  Patient denies any SI or HI.  Patient denies any auditory visual hallucinations.      The following portions of the patient's history were reviewed and updated as appropriate: allergies, current medications, past family history, past medical history, past social history, past surgical history and problem list.    Review of Systems   Psychiatric/Behavioral: Positive for dysphoric mood. Negative for agitation. The patient is nervous/anxious.        Objective   Physical Exam   Constitutional: She appears well-developed and well-nourished.   Psychiatric: Her speech is normal. Judgment and thought content normal. Her mood appears anxious. She is agitated. Cognition and memory are normal. She exhibits a depressed mood.   Nursing note and vitals reviewed.    Blood pressure 139/84, pulse 80, height 165.1 cm (65\"), weight 75.8 kg (167 lb). Body mass index is 27.79 kg/m².      No Known Allergies    No results found for this or any previous visit (from the past 2016 hour(s)).    Current Medications:   Current Outpatient Medications   Medication Sig Dispense Refill   • docusate calcium (SURFAK) 240 MG capsule Take 240 mg by mouth 2 (Two) Times a Day.     • esomeprazole (nexIUM) 20 MG capsule      • estradiol (MINIVELLE, VIVELLE-DOT) 0.05 MG/24HR patch      • fluorometholone (FML) 0.1 % ophthalmic suspension      • FLUoxetine (PROzac) 40 MG capsule Take 1 capsule by mouth Daily for 90 days. 90 capsule 0   • ibuprofen (ADVIL,MOTRIN) 600 MG tablet Take 600 mg by mouth Daily.     • Lidocaine HCl Urethral/Mucosal 2% (XYLOCAINE) 2 % gel lidocaine HCl 2 % mucosal jelly     • Loteprednol Etabonate (LOTEMAX) 0.5 % ointment Lotemax 0.5 % eye ointment     • melatonin 5 MG tablet tablet Take 5 mg by mouth.     • montelukast (SINGULAIR) 10 MG tablet      • " rosuvastatin (CRESTOR) 40 MG tablet      • sulfacetamide-prednisolone (BLEPHAMIDE) 10-0.2 % ophthalmic suspension Blephamide 10 %-0.2 % eye drops,suspension     • sulfacetaminde-prednisolone (BLEPHAMIDE S.O.P.) 10-0.2 % ophthalmic ointment Blephamide S.O.P. 10 %-0.2 % eye ointment     • traZODone (DESYREL) 50 MG tablet Take 1/2 to 1 tablet by mouth At Night As Needed for Sleep. 90 tablet 0   • buPROPion SR (WELLBUTRIN SR) 150 MG 12 hr tablet Take 1 tablet by mouth 2 (Two) Times a Day. 60 tablet 0     No current facility-administered medications for this visit.        Mental Status Exam:   Hygiene:   good  Cooperation:  Cooperative  Eye Contact:  Good  Psychomotor Behavior:  Appropriate  Affect:  Appropriate  Hopelessness: Denies  Speech:  Normal  Thought Process:  Goal directed  Thought Content:  Normal  Suicidal:  None  Homicidal:  None  Hallucinations:  None  Delusion:  None  Memory:  Intact  Orientation:  Person, Place, Time and Situation  Reliability:  good  Insight:  Fair  Judgement:  Good  Impulse Control:  Good  Physical/Medical Issues:  No     Assessment/Plan   Diagnoses and all orders for this visit:    Major depressive disorder, recurrent episode, moderate (CMS/HCC)  -     FLUoxetine (PROzac) 40 MG capsule; Take 1 capsule by mouth Daily for 90 days.  -     buPROPion SR (WELLBUTRIN SR) 150 MG 12 hr tablet; Take 1 tablet by mouth 2 (Two) Times a Day.    Generalized anxiety disorder  -     FLUoxetine (PROzac) 40 MG capsule; Take 1 capsule by mouth Daily for 90 days.    Insomnia due to other mental disorder  -     traZODone (DESYREL) 50 MG tablet; Take 1/2 to 1 tablet by mouth At Night As Needed for Sleep.        I spent a total of  25 minutes in direct patient care, greater than 15 minutes (greater than 50%) were spent in coordination of care, and counseling the patient regarding depression and anger. Answered any questions patient had with medication and plan.  Allow the patient to spend several minutes  discussing her relationship with her  that she believes is a contributor to a lot of her mood as well as her ability to let go of the past.    Begin Wellbutrin 150 mg twice daily as adjunct for depression and mood.  Encouraged patient that if she had any worsening symptoms or side effects to stop and contact the [facility] patient agreed.  Continue fluoxetine 40 mg daily for depression and anxiety symptoms as patient reports has been helpful without any side effects.  Continue trazodone 25-50 mg at night as needed for sleep as the patient reports it has been beneficial. Discussed the risks, beneefits, and side effects of the medications; patient ackowledged and verbally consentedd.  Patient is aware to call the [facility] with any worsening of symptoms.  Patient is agreeable to call 911 or go to the nearest ER should he/she begin having SI/HI.  Encourage patient to continue following up with Carla for therapy as well as positive coping skills.    Prognosis: Guarded dependent on medication/follow up and treatment plan compliance.  Functionality: pt showing improvements in important areas of daily functioning but struggling with depression and her mood will add medications and follow over the next 3 to 4 weeks.  Patient will follow-up in 3 weeks to assess the effectiveness of the Wellbutrin and then is aware she will be following up with Dr. Orona or 1 of the other nurse practitioners, encouraged the patient to call if she had any side effects or worsening symptoms patient agreeable.      10:45-11:05am 20 minutes spent on SUPPORTIVE PSYCHOTHERAPY: continuing efforts to promote the therapeutic alliance, address the patient’s issues, and strengthen self awareness, insights, and coping skills.  Allow the patient to freely and openly express her feelings in regard to her .  Patient also states that she has had a difficult time letting go of the past and moving forward due to the anger that she has  had.  Encourage patient that she needs to be discussing this in therapy as well and then using the skills the therapist has taught her.  Patient states she has been working on her mindful exercises which have helped.  Also encouraged the patient to begin journaling and when she has those thoughts to write it down and burn it that waits out of sight out of mind which may help with some of her negative thoughts that she has had in the past and some of the anger so she can move forward.         Errors in dictation may reflect use of voice recognition software and not all errors in transcription may have been detected prior to signing.

## 2020-02-11 ENCOUNTER — OFFICE VISIT (OUTPATIENT)
Dept: PSYCHIATRY | Facility: CLINIC | Age: 65
End: 2020-02-11

## 2020-02-11 DIAGNOSIS — F41.1 GENERALIZED ANXIETY DISORDER: ICD-10-CM

## 2020-02-11 DIAGNOSIS — F33.1 MAJOR DEPRESSIVE DISORDER, RECURRENT EPISODE, MODERATE (HCC): Primary | ICD-10-CM

## 2020-02-11 PROCEDURE — 90837 PSYTX W PT 60 MINUTES: CPT | Performed by: SOCIAL WORKER

## 2020-02-11 NOTE — PROGRESS NOTES
PROGRESS NOTE  Data:  Anita Gorman came in 2/11/2020 for her regularly scheduled therapy session, with Carla Merritt, MIKAYLA,LUIS from 12:50 pm to 1:45 pm.  Patient reports that she is working hard to focus on herself and let go of the anger and frustration she has towards herself and .  Patient shares a recent session with the psychiatrist and feeling validated about her experiences in her marriage.  Patient shares that she continues to walk she has been attending a Bible study and has begun sewing again which she finds relaxing and enjoyable.  She shares that the medication Wellbutrin has been quite helpful in improving her energy level focus and concentration.  Patient shares she is hopeful that she and  can work things out.    Clinical Maneuvering/Intervention:  Assisted patient in processing above session content; acknowledged and normalized patient’s thoughts, feelings, and concerns. Allowed patient to freely discuss issues without interruption or judgment. Provided safe, confidential environment to facilitate the development of positive therapeutic relationship and encourage open, honest communication.  Continuing to use cognitive behavioral therapy and boundaries to assist patient in managing her anger frustration anxiety and depression.  Patient is open as she continues to identify negative cognitions that contribute to these emotions.  Discussed ways to grow boundaries gave her a workbook to use with the boundaries and marriage book she is currently reading.  Shared more information about her illness answered questions.  Encouraged pt the importance of keeping all appointments and taking medications as prescribed if prescribed  and calling with any questions or concerns.    Assisted patient in identifying risk factors which would indicate the need for higher level of care including thoughts to harm self or others and/or self-harming behavior and encouraged patient to  contact this office, call 911, or present to the nearest emergency room should any of these events occur. Discussed crisis intervention services and means to access.  Patient adamantly and convincingly denies current suicidal or homicidal ideation or perceptual disturbance.    Assessment     Diagnoses and all orders for this visit:    Major depressive disorder, recurrent episode, moderate (CMS/HCC)    Generalized anxiety disorder        Patient presents for session on time, clean and casually dressed with depressed/anxious mood and congruent affect. No evidence of intoxication, withdrawal, or perceptual disturbance. Patient appears to maintain relative stability as compared to their baseline.  However, patient continues to struggle with anger, frustration, anxiety and depression which continues to cause impairment in important areas of functioning.  A result, they can be reasonably expected to continue to benefit from treatment and would likely be at increased risk for decompensation otherwise Association’s intact, abstraction intact. Thought process is linear and logical. Speech is clear and coherent. Patient is oriented to person, place, and time. Attention and concentration fair. Insight and judgment fair. Patient reports no current suicidal or homicidal ideation. Patient appears cooperative and agreeable to treatment and appears to begin to develop rapport. Patient does not appear to be malingering.        ROS: Patient is positive for anxiety, depression, excessive stress and marital problems    Mental Status Exam:   Hygiene:   good  Cooperation:  Cooperative  Eye Contact:  Good  Psychomotor Behavior:  Appropriate  Affect:  Appropriate  Mood: euthymic  Speech:  Normal  Thought Process:  Linear  Thought Content:  Normal  Suicidal:  None  Homicidal:  None  Hallucinations:  None  Delusion:  None  Memory:  Intact  Orientation:  Person, Place, Time and Situation  Reliability:  fair  Insight:  Fair  Judgement:   Fair  Impulse Control:  Fair  Physical/Medical Issues:  no acute medical issues       Patient's Support Network Includes:  , daughter and extended family    Progress toward goal: Not at goal    Functional Status: Moderate impairment     Prognosis: Good with Ongoing Treatment              Patient will have at least monthly outpatient psychotherapy sessions or earlier if symptoms worsen or fail to improve and pharmacotherapy as scheduled with the focus on improved functioning and coping skills, maintaining stability and avoiding decompensation and the need for a higher level of care.  Patient will adhere to medication regimen as prescribed and report any side effects. Patient will contact this office, call 911 or present to the nearest emergency room should suicidal or homicidal ideations occur. Provide Cognitive Behavioral Therapy and Solution Focused Therapy to improve functioning, maintain stability, and avoid decompensation and the need for higher level of care.     Carla Merritt, MIKAYLA,[facility]

## 2020-02-19 ENCOUNTER — OFFICE VISIT (OUTPATIENT)
Dept: PSYCHIATRY | Facility: CLINIC | Age: 65
End: 2020-02-19

## 2020-02-19 VITALS
DIASTOLIC BLOOD PRESSURE: 65 MMHG | HEART RATE: 94 BPM | HEIGHT: 65 IN | SYSTOLIC BLOOD PRESSURE: 125 MMHG | WEIGHT: 166 LBS | BODY MASS INDEX: 27.66 KG/M2

## 2020-02-19 DIAGNOSIS — F33.1 MAJOR DEPRESSIVE DISORDER, RECURRENT EPISODE, MODERATE (HCC): ICD-10-CM

## 2020-02-19 DIAGNOSIS — F41.1 GENERALIZED ANXIETY DISORDER: ICD-10-CM

## 2020-02-19 PROCEDURE — 99213 OFFICE O/P EST LOW 20 MIN: CPT | Performed by: NURSE PRACTITIONER

## 2020-02-19 RX ORDER — MONTELUKAST SODIUM 10 MG/1
TABLET ORAL
COMMUNITY
Start: 2019-12-17 | End: 2020-02-19 | Stop reason: SDUPTHER

## 2020-02-19 RX ORDER — ROSUVASTATIN CALCIUM 40 MG/1
TABLET, COATED ORAL
COMMUNITY
Start: 2020-01-31 | End: 2020-02-19 | Stop reason: SDUPTHER

## 2020-02-19 RX ORDER — FLUOXETINE HYDROCHLORIDE 40 MG/1
40 CAPSULE ORAL DAILY
Qty: 90 CAPSULE | Refills: 0 | Status: SHIPPED | OUTPATIENT
Start: 2020-02-19 | End: 2020-05-11 | Stop reason: SDUPTHER

## 2020-02-19 RX ORDER — BUPROPION HYDROCHLORIDE 150 MG/1
150 TABLET, EXTENDED RELEASE ORAL 2 TIMES DAILY
Qty: 180 TABLET | Refills: 0 | Status: SHIPPED | OUTPATIENT
Start: 2020-02-19 | End: 2020-05-11 | Stop reason: SDUPTHER

## 2020-02-19 NOTE — PROGRESS NOTES
"Subjective   Anita Gorman is a 65 y.o. female who is here today for medication management follow up.    Chief Complaint:  Follow-up for depression and anxiety     History of Present Illness   Patient presents today noting that she has been doing better with the Wellbutrin.  She states that she has more get up and go as well as better energy and focus.  Patient states that she is not standing around and dwelling on her  and the things that have happened in the past.  She reports that her depression along with her mood has been better as well.  Patient also notes that her anxiety has had improvement as well and she is not as irritable.  Patient denies any side effects to the medication.  She states that she is sleeping well and is taking melatonin at night and getting roughly 7 to 8 hours of sleep.  Patient stated that she is going to therapy and her  will be starting therapy as well which she thinks will be beneficial for their marriage.  Overall patient states that she is tolerating things well and feels stable on the current medication regimen.  Patient denies any SI or HI.  Patient denies any auditory or visual hallucinations.      The following portions of the patient's history were reviewed and updated as appropriate: allergies, current medications, past family history, past medical history, past social history, past surgical history and problem list.    Review of Systems   Psychiatric/Behavioral: Negative for agitation and dysphoric mood. The patient is nervous/anxious.        Objective   Physical Exam   Constitutional: She appears well-developed and well-nourished.   Psychiatric: Her speech is normal and behavior is normal. Judgment and thought content normal. Her mood appears anxious. She is not agitated. Cognition and memory are normal. She does not exhibit a depressed mood.   Nursing note and vitals reviewed.    Blood pressure 125/65, pulse 94, height 165.1 cm (65\"), weight 75.3 kg (166 " lb). Body mass index is 27.62 kg/m².      No Known Allergies    No results found for this or any previous visit (from the past 2016 hour(s)).    Current Medications:   Current Outpatient Medications   Medication Sig Dispense Refill   • buPROPion SR (WELLBUTRIN SR) 150 MG 12 hr tablet Take 1 tablet by mouth 2 (Two) Times a Day. 180 tablet 0   • docusate calcium (SURFAK) 240 MG capsule Take 240 mg by mouth 2 (Two) Times a Day.     • esomeprazole (nexIUM) 20 MG capsule      • estradiol (MINIVELLE, VIVELLE-DOT) 0.05 MG/24HR patch      • fluorometholone (FML) 0.1 % ophthalmic suspension      • FLUoxetine (PROzac) 40 MG capsule Take 1 capsule by mouth Daily for 90 days. 90 capsule 0   • ibuprofen (ADVIL,MOTRIN) 600 MG tablet Take 600 mg by mouth Daily.     • Lidocaine HCl Urethral/Mucosal 2% (XYLOCAINE) 2 % gel lidocaine HCl 2 % mucosal jelly     • Loteprednol Etabonate (LOTEMAX) 0.5 % ointment Lotemax 0.5 % eye ointment     • melatonin 5 MG tablet tablet Take 5 mg by mouth.     • montelukast (SINGULAIR) 10 MG tablet      • rosuvastatin (CRESTOR) 40 MG tablet      • sulfacetamide-prednisolone (BLEPHAMIDE) 10-0.2 % ophthalmic suspension Blephamide 10 %-0.2 % eye drops,suspension     • sulfacetaminde-prednisolone (BLEPHAMIDE S.O.P.) 10-0.2 % ophthalmic ointment Blephamide S.O.P. 10 %-0.2 % eye ointment     • traZODone (DESYREL) 50 MG tablet Take 1/2 to 1 tablet by mouth At Night As Needed for Sleep. 90 tablet 0     No current facility-administered medications for this visit.        Mental Status Exam:   Hygiene:   good  Cooperation:  Cooperative  Eye Contact:  Good  Psychomotor Behavior:  Appropriate  Affect:  Appropriate  Hopelessness: Denies  Speech:  Normal  Thought Process:  Goal directed  Thought Content:  Normal  Suicidal:  None  Homicidal:  None  Hallucinations:  None  Delusion:  None  Memory:  Intact  Orientation:  Person, Place, Time and Situation  Reliability:  good  Insight:  Fair  Judgement:  Good  Impulse  Control:  Good  Physical/Medical Issues:  No     Assessment/Plan   Diagnoses and all orders for this visit:    Major depressive disorder, recurrent episode, moderate (CMS/HCC)  -     buPROPion SR (WELLBUTRIN SR) 150 MG 12 hr tablet; Take 1 tablet by mouth 2 (Two) Times a Day.  -     FLUoxetine (PROzac) 40 MG capsule; Take 1 capsule by mouth Daily for 90 days.    Generalized anxiety disorder  -     FLUoxetine (PROzac) 40 MG capsule; Take 1 capsule by mouth Daily for 90 days.        Discussed medication options and side effects in detail with the patient.  Patient reports that she is doing well with the medication and denies any side effects.  Patient states that she does forget to take the second dose of Wellbutrin but is tolerating just 1 dose daily fine but encouraged her that if she did have any worsening symptoms or felt that her depression was getting worse then to set an alarm on her phone to remind her to take the second dose as this would help patient was agreeable and stated that she would be trying this.    Continue Wellbutrin 150 mg twice daily as adjunct for depression and mood. Continue fluoxetine 40 mg daily for depression and anxiety symptoms as patient reports has been helpful without any side effects.  Continue trazodone 25-50 mg at night as needed for sleep as the patient reports it has been beneficial, but only takes as needed due to constipation.  Discussed the risks, beneefits, and side effects of the medications; patient ackowledged and verbally consentedd.  Patient is aware to call the [facility] with any worsening of symptoms.  Patient is agreeable to call 911 or go to the nearest ER should he/she begin having SI/HI.  Encourage patient to continue following up with Carla for therapy as well as positive coping skills.    Prognosis: Guarded dependent on medication/follow up and treatment plan compliance.  Functionality: pt showing improvements in important areas of daily functioning with  depression and anxiety and we will follow-up over the next 3 months.  Patient will follow-up in 3 months that she feels stable on the current medication regimen, highly encouraged the patient that if she had any side effects or any worsening symptoms to contact the [facility] for sooner appointment patient agreed.      Errors in dictation may reflect use of voice recognition software and not all errors in transcription may have been detected prior to signing.

## 2020-03-30 ENCOUNTER — TELEMEDICINE - AUDIO (OUTPATIENT)
Dept: PSYCHIATRY | Facility: CLINIC | Age: 65
End: 2020-03-30

## 2020-03-30 DIAGNOSIS — F33.1 MAJOR DEPRESSIVE DISORDER, RECURRENT EPISODE, MODERATE (HCC): Primary | ICD-10-CM

## 2020-03-30 DIAGNOSIS — F41.1 GENERALIZED ANXIETY DISORDER: ICD-10-CM

## 2020-03-30 PROCEDURE — 90837 PSYTX W PT 60 MINUTES: CPT | Performed by: SOCIAL WORKER

## 2020-03-30 NOTE — PROGRESS NOTES
"  PROGRESS NOTE      “This provider is located at [facility], [address] [place] [address]. [place], [address]. Attempted to see the patient is at her home in a private location, using telephone. Patient is not being seen via telehealth and stated they are in a secure environment for this session, because of connectivity. The patient's condition being diagnosed/treated is appropriate for telemedicine. The provider identified herself as well as her credentials.   The patient and/or patients guardian consent to be seen remotely, and when consent is given they understand that the consent allows for patient identifiable information to be sent to a third party as needed.   They may refuse to be seen remotely at any time. The electronic data is encrypted and password protected, and the patient has been advised of the potential risks to privacy not withstanding such measures.”    This visit has been rescheduled as a phone visit to comply with patient safety concerns in accordance with CDC recommendations. Total time of discussion was 53 minutes.      Anita Gorman 3/30/2020 for her regularly scheduled therapy session, with Carla Merritt LCSW, LCADC from 10:00 to 10:53 am.  Patient reports has improved in some ways and worsened in others   The patient reports that her own mood is less depressed but she is very irritable and annoyed at  for his inability to meet her emotional needs.  \"When I am not emotionally intimate it means I am not safe.\" Patient mentions having internal shakes and trembles at night and questions if this is medication.      Clinical Maneuvering/Intervention:  Assisted patient in processing above session content; acknowledged and normalized patient’s thoughts, feelings, and concerns. Allowed patient to freely discuss issues without interruption or judgment. Provided safe, confidential environment to facilitate the development of positive " "therapeutic relationship and encourage open, honest communication. Continued to use CBT to challenge her negative thoughts, \"should statements\" with patient acknowledge feeling of anger and frustrations.  The patient shares that she did not realize how angry and frustrated she is feeling and not being able to do anything to \"fix\" it.  Discussed mindfulness and self care which the patient was open to.  Reviewed [facility] on line meeting and codependency support which she will explore for additional support.  Encouraged pt the importance of keeping all appointments and taking medications as prescribed if prescribed  and calling with any questions or concerns.    Assisted patient in identifying risk factors which would indicate the need for higher level of care including thoughts to harm self or others and/or self-harming behavior and encouraged patient to contact this office, call 911, or present to the nearest emergency room should any of these events occur. Discussed crisis intervention services and means to access.  Patient adamantly and convincingly denies current suicidal or homicidal ideation or perceptual disturbance.    Assessment     Diagnoses and all orders for this visit:    Major depressive disorder, recurrent episode, moderate (CMS/HCC)    Generalized anxiety disorder        Patient presents for session on time, clean and casually dressed with depressed/anxious mood and congruent affect. No evidence of intoxication, withdrawal, or perceptual disturbance. Patient appears to maintain relative stability as compared to their baseline.  However, patient continues to struggle with depression and anxiety with negative self talk which continues to cause impairment in important areas of functioning.  A result, they can be reasonably expected to continue to benefit from treatment and would likely be at increased risk for decompensation otherwise Association’s intact, abstraction intact. Thought process is linear and " logical. Speech is clear and coherent. Patient is oriented to person, place, and time. Attention and concentration fair. Insight and judgment fair. Patient reports no current suicidal or homicidal ideation. Patient appears cooperative and agreeable to treatment and appears to begin to develop rapport. Patient does not appear to be malingering.              ROS: Patient is positive for anxiety, depression and excessive stress    Mental Status Exam:   Hygiene:   fair  Cooperation:  Cooperative  Eye Contact:  by report  Psychomotor Behavior:  trembly inside  Affect:  Appropriate  Mood: irritable  Speech:  Normal  Thought Process:  Linear  Thought Content:  Normal  Suicidal:  None  Homicidal:  None  Hallucinations:  None  Delusion:  None  Memory:  Intact  Orientation:  Person, Place, Time and Situation  Reliability:  fair  Insight:  Fair  Judgement:  Fair  Impulse Control:  Fair  Physical/Medical Issues:  no acute medical issue       Patient's Support Network Includes:  , daughter and extended family    Progress toward goal: Not at goal    Functional Status: Moderate impairment     Prognosis: Good with Ongoing Treatment             If symptoms/behaviors get worse, patient will present to the nearest hospital for an assessment. Advised patient of [facility] 24/7 assessment services.       Future Appointments       Provider Department Center    4/14/2020 3:00 PM Carla Meadows LCSW [facility]     5/19/2020 11:00 AM Cheri Kohler APRN [facility]             Patient will have at least monthly outpatient psychotherapy sessions or earlier if symptoms worsen or fail to improve and pharmacotherapy as scheduled with the focus on improved functioning and coping skills, maintaining stability and avoiding decompensation and the need for a higher level of care.      Patient will adhere to medication regimen as prescribed and  report any side effects. Patient will contact this office, call 911 or present to the nearest emergency room should suicidal or homicidal ideations occur. Provide Cognitive Behavioral Therapy and Solution Focused Therapy to improve functioning, maintain stability, and avoid decompensation and the need for higher level of care.     Carla Merritt, MIKAYLA,[facility]

## 2020-04-09 ENCOUNTER — LAB (OUTPATIENT)
Dept: LAB | Facility: HOSPITAL | Age: 65
End: 2020-04-09

## 2020-04-09 ENCOUNTER — TRANSCRIBE ORDERS (OUTPATIENT)
Dept: ADMINISTRATIVE | Facility: HOSPITAL | Age: 65
End: 2020-04-09

## 2020-04-09 DIAGNOSIS — J06.9 ACUTE RESPIRATORY DISEASE: ICD-10-CM

## 2020-04-09 DIAGNOSIS — J06.9 ACUTE RESPIRATORY DISEASE: Primary | ICD-10-CM

## 2020-04-09 PROCEDURE — U0003 INFECTIOUS AGENT DETECTION BY NUCLEIC ACID (DNA OR RNA); SEVERE ACUTE RESPIRATORY SYNDROME CORONAVIRUS 2 (SARS-COV-2) (CORONAVIRUS DISEASE [COVID-19]), AMPLIFIED PROBE TECHNIQUE, MAKING USE OF HIGH THROUGHPUT TECHNOLOGIES AS DESCRIBED BY CMS-2020-01-R: HCPCS

## 2020-04-09 PROCEDURE — 87635 SARS-COV-2 COVID-19 AMP PRB: CPT

## 2020-04-11 LAB — SARS-COV-2 RNA RESP QL NAA+PROBE: DETECTED

## 2020-05-11 DIAGNOSIS — F51.05 INSOMNIA DUE TO OTHER MENTAL DISORDER: ICD-10-CM

## 2020-05-11 DIAGNOSIS — F99 INSOMNIA DUE TO OTHER MENTAL DISORDER: ICD-10-CM

## 2020-05-11 DIAGNOSIS — F33.1 MAJOR DEPRESSIVE DISORDER, RECURRENT EPISODE, MODERATE (HCC): ICD-10-CM

## 2020-05-11 DIAGNOSIS — F41.1 GENERALIZED ANXIETY DISORDER: ICD-10-CM

## 2020-05-11 RX ORDER — TRAZODONE HYDROCHLORIDE 50 MG/1
25-50 TABLET ORAL NIGHTLY PRN
Qty: 90 TABLET | Refills: 0 | Status: SHIPPED | OUTPATIENT
Start: 2020-05-11 | End: 2020-07-15

## 2020-05-11 RX ORDER — FLUOXETINE HYDROCHLORIDE 40 MG/1
40 CAPSULE ORAL DAILY
Qty: 90 CAPSULE | Refills: 0 | Status: SHIPPED | OUTPATIENT
Start: 2020-05-11 | End: 2020-05-19 | Stop reason: SDUPTHER

## 2020-05-11 RX ORDER — BUPROPION HYDROCHLORIDE 150 MG/1
150 TABLET, EXTENDED RELEASE ORAL 2 TIMES DAILY
Qty: 180 TABLET | Refills: 0 | Status: SHIPPED | OUTPATIENT
Start: 2020-05-11 | End: 2020-07-15 | Stop reason: SINTOL

## 2020-05-13 ENCOUNTER — TELEPHONE (OUTPATIENT)
Dept: PSYCHIATRY | Facility: CLINIC | Age: 65
End: 2020-05-13

## 2020-05-13 NOTE — TELEPHONE ENCOUNTER
I spoke with Anita this morning to get her MyChart set up for her appointment on 5/19/20. She stated that she had left a VM with the [facility] on Monday about her medicine.   She said she will be out of her 40mg Prozac in a couple of days but she still has some of the 20mg left. She wanted to know if it would be ok for her to take two of the 20mg until her appointment with you or if she needed a new prescription called in.

## 2020-05-13 NOTE — TELEPHONE ENCOUNTER
Yes she can take (2) of the 20mg until I see her next week if she will have plenty. If not I can send in the 40mg whatever she would like.

## 2020-05-19 ENCOUNTER — TELEMEDICINE (OUTPATIENT)
Dept: PSYCHIATRY | Facility: CLINIC | Age: 65
End: 2020-05-19

## 2020-05-19 DIAGNOSIS — F33.1 MAJOR DEPRESSIVE DISORDER, RECURRENT EPISODE, MODERATE (HCC): ICD-10-CM

## 2020-05-19 DIAGNOSIS — F41.1 GENERALIZED ANXIETY DISORDER: ICD-10-CM

## 2020-05-19 PROCEDURE — 99213 OFFICE O/P EST LOW 20 MIN: CPT | Performed by: NURSE PRACTITIONER

## 2020-05-19 RX ORDER — FLUOXETINE HYDROCHLORIDE 20 MG/1
20 CAPSULE ORAL DAILY
Qty: 90 CAPSULE | Refills: 0 | Status: SHIPPED | OUTPATIENT
Start: 2020-05-19 | End: 2020-07-15 | Stop reason: SDUPTHER

## 2020-05-19 NOTE — PROGRESS NOTES
Subjective   Anita Gorman is a 65 y.o. female who is here today for medication management follow up.    This provider is located at [address] [place] [address]. The Patient is seen remotely at home [address] [place] [address] [place] [address], using [employer]. Patient is being seen via telehealth and confirm that they are in a secure environment for this session. The patient's condition being diagnosed/treated is appropriate for telemedicine. The provider identified himself/herself: herself as well as her credentials.   The patient gave consent to be seen remotely, and when consent is given they understand that the consent allows for patient identifiable information to be sent to a third party as needed.   They may refuse to be seen remotely at any time. The electronic data is encrypted and password protected, and the patient has been advised of the potential risks to privacy not withstanding such measures.    You have chosen to receive care through a telehealth visit.  Do you consent to use a video/audio connection for your medical care today? Yes      Chief Complaint:  Follow-up for depression and anxiety     History of Present Illness   Patient is being seen via my chart telemedicine and has consented.  Patient notes that she has been feeling a lot better.  Patient states she was having symptoms of the coronavirus and had a swab on April 9 which was confirmed positive.  Patient stated that she had mild symptoms and staying home and isolated for 2 weeks and then was doing better.  She noted that the fatigue and pinkeye were the worst of her symptoms but otherwise is doing much better.  Patient states the Wellbutrin was effective for her at first but then she started noticing for roughly 2 weeks she felt shaky on the inside and her  even noted visible shakiness so she stopped taking roughly 2 weeks ago.  Patient states that she ran out of the 40 mg of Prozac so she thought she was taking to  20 mg when in actuality she was only taking one 20mg tablet of the Prozac.  She notes that since she has been doing that for the past week she has felt better and she states her depression and anxiety have been manageable or almost nonexistent.  She states that she has not had any issues or major concerns.  She notes things are better at home with her spouse as he is seeing a therapist.  Patient states that overall things have been going much better for her has not had any major concerns.  She reports that she is sleeping well with the melatonin.  Patient states that her only concern at this time is her weight gain with the staying home and eating more not being able to get outside which she hopes will be ending soon.  Patient states that she is still seeing Carla for therapy which is going well.  Patient denied any side effects to the Prozac and only reported shakiness to the Wellbutrin which she is already discontinued.  Patient denied any SI or HI.  Patient denied any auditory or visual hallucinations.  Patient denies any new medical or medicine changes at this time.        The following portions of the patient's history were reviewed and updated as appropriate: allergies, current medications, past family history, past medical history, past social history, past surgical history and problem list.    Review of Systems   Psychiatric/Behavioral: Negative.  Negative for agitation and dysphoric mood. The patient is not nervous/anxious.        Objective   Physical Exam   Constitutional: She appears well-developed and well-nourished.   Psychiatric: She has a normal mood and affect. Her speech is normal and behavior is normal. Judgment and thought content normal. Her mood appears not anxious. She is not agitated. Cognition and memory are normal. She does not exhibit a depressed mood.     There were no vitals taken for this visit. There is no height or weight on file to calculate BMI.  Due to extenuating circumstances and  possible current health risks associated with the patient being present in a clinical setting (with current health restrictions in place in regards to possible COVID 19 transmission/exposure), the patient was seen remotely today via a MyChart Video Visit through [facility].  Unable to obtain vital signs due to nature of remote visit.  Height stated at 65 inches.  Weight stated at 166 pounds. Pt noted she has gained weight       No Known Allergies    Recent Results (from the past 2016 hour(s))   SARS-CoV-2, MEENA ([facility]) - Swab, Oropharynx    Collection Time: 04/09/20  2:00 PM   Result Value Ref Range    SARS-CoV-2, MEENA Detected (A) Not Detected       Current Medications:   Current Outpatient Medications   Medication Sig Dispense Refill   • docusate calcium (SURFAK) 240 MG capsule Take 240 mg by mouth 2 (Two) Times a Day.     • esomeprazole (nexIUM) 20 MG capsule      • estradiol (MINIVELLE, VIVELLE-DOT) 0.05 MG/24HR patch      • fluorometholone (FML) 0.1 % ophthalmic suspension      • FLUoxetine (PROzac) 20 MG capsule Take 1 capsule by mouth Daily for 90 days. 90 capsule 0   • Loteprednol Etabonate (LOTEMAX) 0.5 % ointment Lotemax 0.5 % eye ointment     • melatonin 5 MG tablet tablet Take 5 mg by mouth.     • montelukast (SINGULAIR) 10 MG tablet      • rosuvastatin (CRESTOR) 40 MG tablet      • sulfacetamide-prednisolone (BLEPHAMIDE) 10-0.2 % ophthalmic suspension Blephamide 10 %-0.2 % eye drops,suspension     • buPROPion SR (Wellbutrin SR) 150 MG 12 hr tablet Take 1 tablet by mouth 2 (Two) Times a Day. 180 tablet 0   • ibuprofen (ADVIL,MOTRIN) 600 MG tablet Take 600 mg by mouth Daily.     • Lidocaine HCl Urethral/Mucosal 2% (XYLOCAINE) 2 % gel lidocaine HCl 2 % mucosal jelly     • sulfacetaminde-prednisolone (BLEPHAMIDE S.O.P.) 10-0.2 % ophthalmic ointment Blephamide S.O.P. 10 %-0.2 % eye ointment     • traZODone (DESYREL) 50 MG tablet Take 1/2 to 1 tablet by mouth At Night As Needed for Sleep. 90 tablet 0     No current  facility-administered medications for this visit.        Mental Status Exam:   Hygiene:   good  Cooperation:  Cooperative  Eye Contact:  Good  Psychomotor Behavior:  Appropriate  Affect:  Appropriate  Hopelessness: Denies  Speech:  Normal  Thought Process:  Goal directed  Thought Content:  Normal  Suicidal:  None  Homicidal:  None  Hallucinations:  None  Delusion:  None  Memory:  Intact  Orientation:  Person, Place, Time and Situation  Reliability:  good  Insight:  Fair  Judgement:  Good  Impulse Control:  Good  Physical/Medical Issues:  No     Assessment/Plan   Diagnoses and all orders for this visit:    Major depressive disorder, recurrent episode, moderate (CMS/HCC)  -     FLUoxetine (PROzac) 20 MG capsule; Take 1 capsule by mouth Daily for 90 days.    Generalized anxiety disorder  -     FLUoxetine (PROzac) 20 MG capsule; Take 1 capsule by mouth Daily for 90 days.        Discussed medication options and side effects in detail with the patient.  Patient denied any concerns and denied any side effects since stopping the Wellbutrin.  Encouraged continued therapy with Carla.     Discontinue Wellbutrin as it caused the patient side effects.  Continue Prozac 20 mg daily since patient reports she has been taking 20 mg and feeling better than on 40 mg.  Discontinue trazodone as it caused too much constipation as she is tolerating melatonin 5 mg and sleeping well.  Discussed the risks, beneefits, and side effects of the medications; patient ackowledged and verbally consentedd.  Patient is aware to call the [facility] and clinic with any worsening of symptoms.  Patient is agreeable to call 911 or go to the nearest ER should he/she begin having SI/HI.     Prognosis: Guarded dependent on medication/follow up and treatment plan compliance.  Functionality: pt showing improvements in important areas of daily functioning.  Patient will follow-up in 8 weeks since feeling stable on current medication, encouraged her to call  the clinic with any concerns or worsening symptoms.    Short-term goals: Patient will adhere to medication regimen and note continued improvement in symptoms over the next 8 weeks.  Long-term goals: Patient will be adherent to medication management and psychotherapy with continued improvement in symptoms over the next 6 months.      Errors in dictation may reflect use of voice recognition software and not all errors in transcription may have been detected prior to signing.

## 2020-05-21 ENCOUNTER — TELEPHONE (OUTPATIENT)
Dept: PSYCHIATRY | Facility: CLINIC | Age: 65
End: 2020-05-21

## 2020-05-21 NOTE — TELEPHONE ENCOUNTER
So apparently  patient  2 RX of Prozac at the pharmacy a 40mg and a 20mg so patient was just asking for carination on the correct RX I told patient  it should be 20mg but she wanted me to ask you for sure.

## 2020-05-27 ENCOUNTER — OFFICE VISIT (OUTPATIENT)
Dept: PSYCHIATRY | Facility: CLINIC | Age: 65
End: 2020-05-27

## 2020-05-27 DIAGNOSIS — F33.1 MAJOR DEPRESSIVE DISORDER, RECURRENT EPISODE, MODERATE (HCC): Primary | ICD-10-CM

## 2020-05-27 DIAGNOSIS — F41.1 GENERALIZED ANXIETY DISORDER: ICD-10-CM

## 2020-05-27 PROCEDURE — 90837 PSYTX W PT 60 MINUTES: CPT | Performed by: SOCIAL WORKER

## 2020-05-27 NOTE — PROGRESS NOTES
"Date of Service: May 27, 2020  Time In: 3:00 pm  Time Out: 3:55 pm  PROGRESS NOTE  Data:Antia Gorman is a 65 y.o. female who met 1:1 with Carla Merritt LCSW,[facility] for regularly scheduled Individual Therapy session.    Anita presents for session on time, clean and casually dressed with  without evidence of intoxication, withdrawal, or perceptual disturbance.   She was open and Engaged, alert, positive/optimistic disposition and insightful/sound judgment.      Chief Compliant: Patient presents with anxiety & depression    Interactive Complexity: Interactive Complexity No      HPI: Patient shares that she and  both had CVOID 19 and how difficult it was for them She shares that since the last session when negative thoughts were discussed -she realized how angry she was and made the decision that she did not want to feel this anymore and has worked hard to change the thoughts that were triggering the intense emotion.  She shares that her marriage has improved and she is just stating what she wants and then \"letting it go\".   Anxiety fatigue, irritable, racing thoughts  Depression Low mood for > 2 weeks, Decreased/Increased sleep, Loss of Interest, Feelings of guilt/worthlessness, Low energy, Impaired concentration and Change in appetite. Onset of symptoms was vague.  Symptoms are associated with relationship problem with unchanged since last visit and lack of support.  Symptoms are aggravated by anxiety and stress.   Symptoms improve with medication management, therapy and personal self-care (wellness) Current rates severity of symptoms, on a scale of 1-10 (10 is the most severe) 4 Context Family and social history was reviewed and is unchanged since last visit  Quality been intermittent without a consistent pattern.    CLINICAL MANEUVERING/INTERVENTION/SUPPORTIVE PSYCHOTHERAPY: Therapist continued to promote the therapeutic alliance, address the patient’s issues, and strengthen self " awareness, insights, and coping skills.  Therapist applied CBT/REBT and Positive Coping Skills and encouraged she to use positive coping skills such as Exercising, Journaling, Drawing/Art, Listen to music, Taking a bath/shower, Cleaning the house, Massage therapy, Energy redirection, Spending time in nature, Distraction, Use progressive muscle relaxation, Self Care (Take care of your body in a way that makes you feel good - paint your nails, do your hair, put on a face mask), Use positive self-talk, Keep a positive attitude and Utilize resources/coping skills.  Therapist allowed Anita  to freely discuss issues without interruption or judgment. Provided safe, confidential environment to facilitate the development of positive therapeutic relationship and encourage open, honest communication. Assisted patient in identifying increased risk factors which would indicate the need for higher level of care including thoughts to harm self or others, self-harming behavior, and/or binge drinking and encouraged patient to contact this office, call 911, or present to the nearest emergency room should any of these events occur. Discussed crisis intervention services and means to access.    Assessment        Psychological ROS: positive for - anxiety and depression    Mental Status Exam:    Hygiene:   good  Cooperation:  Cooperative  Eye Contact:  Fair  Psychomotor Behavior:  Appropriate  Affect:  Appropriate  Hopelessness: 2  Speech:  Normal  Thought Progress:  Linear  Thought Content:  Normal  Suicidal:  None  Homicidal:  None  Hallucinations:  None  Delusion:  None  Memory:  Intact  Orientation:  Person, Place, Time and Situation  Reliability:  good  Insight:  Good  Judgement:  Good  Impulse Control:  Good  Physical/Medical Issues:  No     Patient's Support Network Includes:  , children, extended family and friends    Progress toward goal: Not at goal    Functional Status: Moderate impairment     Overall: Anxious      VISIT DIAGNOSIS:     ICD-10-CM ICD-9-CM   1. Major depressive disorder, recurrent episode, moderate (CMS/HCC) F33.1 296.32   2. Generalized anxiety disorder F41.1 300.02        PROGNOSIS: good    Anita appears to be mentally/physically stable compared to his baseline functioning.  However, she continues to present with a severe chronic mental illness. As a result,  she  would be at significantly increased risk for decompensation and possibly higher level of care without continued treatment.  It is reasonable to assume she would considerably benefit from ongoing treatment.          PROGRESS TOWARD CURRENT PLAN OF CARE/TREATMENT PLAN :  Making Progress    SHORT-TERM GOALS: Anita  will bathe and dress in street clothes daily, will attend therapy as scheduled, will take all medications as prescribed, will express feelings to therapist each contact , will identify the severity of symptoms each contact, will be compliant with all treatment recommendations, will learn and practice at least 2 anxiety management techniques with goal of decreasing anxiety, will learn and practice at least 2 depression management techniques with goal of decreasing depression and will work with therapist to identify conflicts from the past and the present that form the basis    LONG-TERM GOALS: With the help of therapy, I would like to:   learn how to structure my spare-time more meaningfully (hobbies, etc)  clarify or come to terms with expectations or feelings related to my partner, spouse, or significant other and learn how to be more assertive with others and set appropriate boundaries  discuss health, personal well being and mental health plans or ideas regarding my future   clarify my needs and desires and learn how to express them more effectively, allow myself to experience feelings and express them more effectively and learn how to deal with strong negative feelings (e.g., anger, rage)  learn how to cope with my negative  thoughts, ruminations, or sense of guilt, find a way out of my negative mood, sadness, or sense of inner emptiness and learn how to handle stressful situations better    STRENGTHS: Motivated for treatment, Literate and Articulate    WEAKNESSES: Poor coping skills    Plan   Crisis Plan:  Symptoms and/or behaviors to indicate a crisis: Thinking about suicide    What calming techniques or other strategies will patient use to de-esclate and stay safe: slow down, breathe, visualize calming self, think it though, listen to music, change focus, take a walk  Who is one person patient can contact to assist with de-escalation? Timothy    Crisis Management: Anita will contact staff or crisis line if symptoms exacerbate or if harm to self or others becomes a concern. Crisis resources include: Crisis Line [phone], 911, Local Law Enforcement, [facility], [facility] 24/7 Emergency Room [phone].    PLAN:   Anita will continue in MONTHLY ongoing outpatient treatment via Face-to-Facewith primary therapist and pharmacotherapy as scheduled.   Anita will report any adverse reactions to treatment/medication interventions immediately.  Anita will be compliant with treatment and appointments.   May 27, 2020 15:09    Recommended Referrals: Psychiatrist/APRN  Patient will adhere to medication regimen as prescribed and report any side effects. Patient will contact this office, call 911 or present to the nearest emergency room should suicidal or homicidal ideations occur. Provide Cognitive Behavioral Therapy and Solution Focused Therapy to improve functioning, maintain stability, and avoid decompensation and the need for higher level of care.          Future Appointments       Provider Department Center    6/23/2020 10:00 AM Carla Meadows LCSW [facility]     7/15/2020 1:00 PM Zeina Ortiz APRN [facility]                 Carla Merritt, LCSW, LCADC

## 2020-06-26 ENCOUNTER — OFFICE VISIT (OUTPATIENT)
Dept: PSYCHIATRY | Facility: CLINIC | Age: 65
End: 2020-06-26

## 2020-06-26 DIAGNOSIS — F43.10 POST TRAUMATIC STRESS DISORDER (PTSD): Primary | ICD-10-CM

## 2020-06-26 DIAGNOSIS — F41.1 GENERALIZED ANXIETY DISORDER: ICD-10-CM

## 2020-06-26 PROCEDURE — 90791 PSYCH DIAGNOSTIC EVALUATION: CPT | Performed by: COUNSELOR

## 2020-06-26 NOTE — PROGRESS NOTES
"Subjective   Anita Gorman is a 65 y.o. female who is here today for initial behavioral health evaluation starting at 11:15 AM and ending at 12:45 PM.    Chief Complaint: Patient has been living with the frustration of her 's sex addiction.    History of Present Illness:   Patient reports that for the first 7 to 8 years she and her  have had a 9-10 out of 10 close relationship with 10 being the closest connection.  Today she rates her relationship at 4 out of 10 which has improved to this level more recently.  She noticed the relationship to begin drifting around 2003.  Around that time he began taking off 2 weeks a year to go on week-long motorcycle rides alone.  Patient has been very suspicious that he has been unfaithful during these trips which he adamantly denies.  She has observed how he behaves around women with poor boundaries and been surprised when they make a pass at him.  \"He is such an attention seeker.  He can tell me I am so beautiful and the next thing he is looking at someone else.\"  \"He confesses some of what he is done but he will admit that he went that far.\"  Patient has complained and accused him and he always denies it until he was confronted last October when she had the evidence catching him with audio and video recording devices.  Patient asked, \"who are you?\"  She is very concerned about what she calls \"deviant behavior\" that could get him into legal problems if a child would happen to observe it.  Patient admits , \"I cannot control myself, but it is your fault.  You do not give me enough sex.  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  She enjoyed her work and is finding herself very busy in custodial.  Prior to custodial they maintain separate checking counts and she began to get nervous during custodial about finances.  The first of 5 " "therapists they have worked with helped her  understand that half of his property is hers and asked if he would want her to take half of it and leave him.  That is what it took for him to agree to have their finances combined.  Patient is frustrated, \"I get no respect and have to be validated by someone else for him to believe me.\"  Patient complained that he is adamant that he has no problem with alcohol or overmedicating.  She shared a photo of his medication bottles \"He is way too defensive and I could not stand it.\"  Patient demanded that her  had to get help.  She is somewhat relieved that \"today both of us are trying.\"  \"I am so blessed.  It is ridiculous that I have not overcome and turn to selfish anxiety and I have not worked through it with the Lord.  Her previous therapist pointed out in April and May that she still was carrying a lot of anger.  \"I thought it was over.  I decided to choose barb. I have been so angry I could not cry and that scared me because 2 years are relieving to me.\"  Patient rated her anger at 0-2 over 10 with 10 being the most severe    Past Psych History:  Patient has worked with 5 previous outpatient therapists with no psychiatric hospitalizations.    Substance Abuse:  Patient first started using alcohol in college and has continued socially.  She admits to drinking wine before dinner every night and on weekends 2 drinks.  She occasionally drinks bourbon instead of wine.    Social History:   Patient grew up believing that her stepfather was her biological father.  At age 37 she learned that her mother got pregnant from a casual relationship with a man named Erasmo.  She was able to locate him and met him once in [place].  He was a retired  who is  and had 3 sons.  The first thing he did was go to the lab to take a paternity test which came out 99% match.  His wife did not allow him to have a relationship with her.  They kept in touch only by " "Esther cards.  Her daughter Timothy is his only grandchild.  \"He was nice.  He called to admit he was diagnosed with Alzheimer's and  3 months later.  The family did not notify her until the [facility] service.  She was quite disappointed.  She has met her 2 surviving half-brothers.  They were polite but she scribed them as \"strange.\"  One is a cross dresser and the other has a mother-in-law who is a practicing witch.  Patient's stepfather and mother  when she was in second grade.  Her father had been in the [employer] and had served in [place] and [place] ending up in [place].  \"I did not know him well.\"  Patient's mother became a  in a bar and disappeared with a \"honky-tonk marrufo for 4-5 years in [place].\"  Patient lived for a year with her maternal grandfather and stepgrandmother.  They took her on a vacation to visit her grandmother's sister and brother and never came back to pick her up.  It was a pleasant home environment and patient did not question how it was she came to stay there until she was in college.  Her mother came back into her life when she was a sophomore in high school.  She was  and invited her to come live with them in [place].  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  Later after her  was killed in a car accident her mother moved back to [place].  Years later patient moved to her mother back to be near her and Kvng however \"she is so selfish that she was not interested or willing to help care for her granddaughter.\"  \"I love and understand her and accept her and do not take it personally.  She has made amends for leaving me.\"      Visit Diagnoses:    ICD-10-CM ICD-9-CM   1. Post traumatic stress disorder (PTSD) F43.10 309.81   2. Generalized anxiety disorder F41.1 300.02         Family Psychiatric History:  family history is not on " file.    Medical/Surgical History:  Past Medical History:   Diagnosis Date   • Depression      Past Surgical History:   Procedure Laterality Date   • BREAST SURGERY      lump removal   • HYSTERECTOMY     • KNEE CARTILAGE SURGERY         No Known Allergies        Current Medications:   Current Outpatient Medications   Medication Sig Dispense Refill   • buPROPion SR (Wellbutrin SR) 150 MG 12 hr tablet Take 1 tablet by mouth 2 (Two) Times a Day. 180 tablet 0   • docusate calcium (SURFAK) 240 MG capsule Take 240 mg by mouth 2 (Two) Times a Day.     • esomeprazole (nexIUM) 20 MG capsule      • estradiol (MINIVELLE, VIVELLE-DOT) 0.05 MG/24HR patch      • fluorometholone (FML) 0.1 % ophthalmic suspension      • FLUoxetine (PROzac) 20 MG capsule Take 1 capsule by mouth Daily for 90 days. 90 capsule 0   • ibuprofen (ADVIL,MOTRIN) 600 MG tablet Take 600 mg by mouth Daily.     • Lidocaine HCl Urethral/Mucosal 2% (XYLOCAINE) 2 % gel lidocaine HCl 2 % mucosal jelly     • Loteprednol Etabonate (LOTEMAX) 0.5 % ointment Lotemax 0.5 % eye ointment     • melatonin 5 MG tablet tablet Take 5 mg by mouth.     • montelukast (SINGULAIR) 10 MG tablet      • rosuvastatin (CRESTOR) 40 MG tablet      • sulfacetamide-prednisolone (BLEPHAMIDE) 10-0.2 % ophthalmic suspension Blephamide 10 %-0.2 % eye drops,suspension     • sulfacetaminde-prednisolone (BLEPHAMIDE S.O.P.) 10-0.2 % ophthalmic ointment Blephamide S.O.P. 10 %-0.2 % eye ointment     • traZODone (DESYREL) 50 MG tablet Take 1/2 to 1 tablet by mouth At Night As Needed for Sleep. 90 tablet 0     No current facility-administered medications for this visit.          Objective   There were no vitals taken for this visit.    Mental Status Exam:   Hygiene:   good  Cooperation:  Cooperative  Eye Contact:  Good  Psychomotor Behavior:  Appropriate  Affect:  Full range  Hopelessness: Denies  Speech:  Normal  Thought Process:  Goal directed and Linear  Thought Content:  Normal  Suicidal:   None  Homicidal:  None  Hallucinations:  None  Delusion:  None  Memory:  Intact  Orientation:  Person, Place, Time and Situation  Reliability:  good  Insight:  Good  Judgement:  Good  Impulse Control:  Good  Physical/Medical Issues:  No       DIAGNOSTIC IMPRESSION:   Encounter Diagnoses   Name Primary?   • Post traumatic stress disorder (PTSD) Yes   • Generalized anxiety disorder        PROBLEM LIST:   Patient has been living with a  struggling with sexual addiction.    STRENGTHS:   Patient appears motivated for treatment is currently engaged and compliant.    WEAKNESSES:  Ineffective coping skills, disease management      SHORT-TERM GOALS: Patient will be compliant with clinic appointments.  Patient will be engaged in therapy, medication compliant with minimal side effects. Patient  will report decreased frequency and severity of symptoms.     LONG-TERM GOALS: Patient will have minimal symptoms of  with continued medication management. Patient will be compliant with treatment and appointments.       PLAN:   Patient will continue with individual outpatient treatment and pharmacotherapy as scheduled.     Return in about 2 weeks (around 7/10/2020).     The patient was instructed to call clinic as needed or go to ER if in crisis.          This document electronically signed by Amaris Lloyd, LPCC, NCC, CSAT    Amaris Lloyd  Licensed Professional Clinical Counselor  Certified Sexual Addiction Therapist   Interpolation Flap Text: A decision was made to reconstruct the defect utilizing an interpolation axial flap and a staged reconstruction.  A telfa template was made of the defect.  This telfa template was then used to outline the interpolation flap.  The donor area for the pedicle flap was then injected with anesthesia.  The flap was excised through the skin and subcutaneous tissue down to the layer of the underlying musculature.  The interpolation flap was carefully excised within this deep plane to maintain its blood supply.  The edges of the donor site were undermined.   The donor site was closed in a primary fashion.  The pedicle was then rotated into position and sutured.  Once the tube was sutured into place, adequate blood supply was confirmed with blanching and refill.  The pedicle was then wrapped with xeroform gauze and dressed appropriately with a telfa and gauze bandage to ensure continued blood supply and protect the attached pedicle.

## 2020-07-15 ENCOUNTER — TELEMEDICINE (OUTPATIENT)
Dept: PSYCHIATRY | Facility: CLINIC | Age: 65
End: 2020-07-15

## 2020-07-15 DIAGNOSIS — F33.0 MAJOR DEPRESSIVE DISORDER, RECURRENT EPISODE, MILD (HCC): ICD-10-CM

## 2020-07-15 DIAGNOSIS — F41.1 GENERALIZED ANXIETY DISORDER: ICD-10-CM

## 2020-07-15 PROCEDURE — 99213 OFFICE O/P EST LOW 20 MIN: CPT | Performed by: NURSE PRACTITIONER

## 2020-07-15 RX ORDER — PHENTERMINE HYDROCHLORIDE 37.5 MG/1
37.5 CAPSULE ORAL EVERY MORNING
COMMUNITY

## 2020-07-15 RX ORDER — FLUOXETINE HYDROCHLORIDE 20 MG/1
20 CAPSULE ORAL DAILY
Qty: 90 CAPSULE | Refills: 1 | Status: SHIPPED | OUTPATIENT
Start: 2020-07-15 | End: 2020-10-13

## 2020-07-15 NOTE — PROGRESS NOTES
Subjective   Anita Gorman is a 65 y.o. female who is here today for medication management follow up.    This provider is located at [facility] [place] [address].  The Patient is seen remotely at home [address] [place] [address] [place] [address], using [employer]. Patient is being seen via telehealth and confirm that they are in a secure environment for this session. The patient's condition being diagnosed/treated is appropriate for telemedicine. The provider identified himself/herself: herself as well as her credentials.   The patient gave consent to be seen remotely, and when consent is given they understand that the consent allows for patient identifiable information to be sent to a third party as needed.   They may refuse to be seen remotely at any time. The electronic data is encrypted and password protected, and the patient has been advised of the potential risks to privacy not withstanding such measures.    You have chosen to receive care through a telehealth visit.  Do you consent to use a video/audio connection for your medical care today? Yes      Chief Complaint:  Follow-up for depression and anxiety     History of Present Illness   Patient is seen today via my chart.  She reports that things are going well for her and she is tolerating the 20 mg of Prozac well without any side effects.  Patient states that other than trying to lose weight she has been doing well.  Patient denied any depressive or anxiety symptoms.  Patient denied any agitation or irritability.  Patient states that she is only taking half of the 37.5 mg Adipex daily which has not interfered with her mood or caused side effects.  Patient reports that she is sleeping well and her appetite has been good.  Patient denies any SI/HI/AH/VH.        The following portions of the patient's history were reviewed and updated as appropriate: allergies, current medications, past family history, past medical  history, past social history, past surgical history and problem list.    Review of Systems   Psychiatric/Behavioral: Negative.  Negative for agitation and dysphoric mood. The patient is not nervous/anxious.        Objective   Physical Exam   Constitutional: She appears well-developed and well-nourished.   Psychiatric: She has a normal mood and affect. Her speech is normal and behavior is normal. Judgment and thought content normal. Her mood appears not anxious. She is not agitated. Cognition and memory are normal. She does not exhibit a depressed mood.     There were no vitals taken for this visit. There is no height or weight on file to calculate BMI.  Due to extenuating circumstances and possible current health risks associated with the patient being present in a clinical setting (with current health restrictions in place in regards to possible COVID 19 transmission/exposure), the patient was seen remotely today via a MyChart Video Visit through [employer].  Unable to obtain vital signs due to nature of remote visit.  Height stated at 65 inches.  Weight stated at 166 pounds.       No Known Allergies    No results found for this or any previous visit (from the past 2016 hour(s)).    Current Medications:   Current Outpatient Medications   Medication Sig Dispense Refill   • docusate calcium (SURFAK) 240 MG capsule Take 240 mg by mouth 2 (Two) Times a Day.     • esomeprazole (nexIUM) 20 MG capsule      • estradiol (MINIVELLE, VIVELLE-DOT) 0.05 MG/24HR patch      • fluorometholone (FML) 0.1 % ophthalmic suspension      • FLUoxetine (PROzac) 20 MG capsule Take 1 capsule by mouth Daily for 180 days. 90 capsule 1   • Lidocaine HCl Urethral/Mucosal 2% (XYLOCAINE) 2 % gel lidocaine HCl 2 % mucosal jelly     • Loteprednol Etabonate (LOTEMAX) 0.5 % ointment Lotemax 0.5 % eye ointment     • melatonin 5 MG tablet tablet Take 5 mg by mouth.     • montelukast (SINGULAIR) 10 MG tablet      • phentermine (Adipex-P) 37.5 MG capsule Take  37.5 mg by mouth Every Morning.     • rosuvastatin (CRESTOR) 40 MG tablet      • sulfacetamide-prednisolone (BLEPHAMIDE) 10-0.2 % ophthalmic suspension Blephamide 10 %-0.2 % eye drops,suspension     • ibuprofen (ADVIL,MOTRIN) 600 MG tablet Take 600 mg by mouth Daily.     • sulfacetaminde-prednisolone (BLEPHAMIDE S.O.P.) 10-0.2 % ophthalmic ointment Blephamide S.O.P. 10 %-0.2 % eye ointment       No current facility-administered medications for this visit.        Mental Status Exam:   Hygiene:   good  Cooperation:  Cooperative  Eye Contact:  Good  Psychomotor Behavior:  Appropriate  Affect:  Appropriate  Hopelessness: Denies  Speech:  Normal  Thought Process:  Goal directed  Thought Content:  Normal  Suicidal:  None  Homicidal:  None  Hallucinations:  None  Delusion:  None  Memory:  Intact  Orientation:  Person, Place, Time and Situation  Reliability:  good  Insight:  Fair  Judgement:  Good  Impulse Control:  Good  Physical/Medical Issues:  No     Assessment/Plan   Diagnoses and all orders for this visit:    Major depressive disorder, recurrent episode, mild (CMS/HCC)  -     FLUoxetine (PROzac) 20 MG capsule; Take 1 capsule by mouth Daily for 180 days.    Generalized anxiety disorder  -     FLUoxetine (PROzac) 20 MG capsule; Take 1 capsule by mouth Daily for 180 days.        Discussed medication options and side effects in detail with the patient.  Patient denied any concerns. Encouraged continued therapy with Amaris as she has switched to Amaris as she has been working with her  and her with his sex addiction. Also informed pt since she has medicare that she would have to go back to the [facility]. She also mentioned coming off Prozac after some more time with Amaris and encouraged her to stay on until she was doing better with therapy and then she could discontinue and talk with the provider at the [facility] once she is ready as it will be 1 year in September.     Continue Prozac 20 mg daily.   Discussed the risks, benefits, and side effects of the medications; patient ackowledged and verbally consented.  Patient is aware to call the [facility] or behavioral health virtual clinic with any worsening of symptoms.  Patient is agreeable to call 911 or go to the nearest ER should he/she begin having SI/HI.     Prognosis: Guarded dependent on medication/follow up and treatment plan compliance.  Functionality: pt showing improvements in important areas of daily functioning.  Patient will follow-up in 3 months since feeling stable on current medication, encouraged her to call the clinic with any concerns or worsening symptoms.    Short-term goals: Patient will adhere to medication regimen and note continued improvement in symptoms over the next 3 months.   Long-term goals: Patient will be adherent to medication management and psychotherapy with continued improvement in symptoms over the next 6 months.      Errors in dictation may reflect use of voice recognition software and not all errors in transcription may have been detected prior to signing.

## 2020-07-22 ENCOUNTER — OFFICE VISIT (OUTPATIENT)
Dept: PSYCHIATRY | Facility: CLINIC | Age: 65
End: 2020-07-22

## 2020-07-22 DIAGNOSIS — F43.10 POST TRAUMATIC STRESS DISORDER (PTSD): Primary | ICD-10-CM

## 2020-07-22 DIAGNOSIS — F41.1 GENERALIZED ANXIETY DISORDER: ICD-10-CM

## 2020-07-22 DIAGNOSIS — Z63.0 MARITAL RELATIONSHIP PROBLEM: ICD-10-CM

## 2020-07-22 PROCEDURE — 90837 PSYTX W PT 60 MINUTES: CPT | Performed by: COUNSELOR

## 2020-07-22 SDOH — SOCIAL STABILITY - SOCIAL INSECURITY: PROBLEMS IN RELATIONSHIP WITH SPOUSE OR PARTNER: Z63.0

## 2020-07-22 NOTE — PROGRESS NOTES
"    PROGRESS NOTE  Data:  Anita Gorman came in 7/22/2020 for her regularly scheduled therapy session starting at 3:30 PM and ending at 4:30 PM, with [facility].    Patient was accompanied by her  Cristobal.     (Scales based on 0 - 10 with 10 being the worst)  Depression: 1 Anxiety: 3     HPI:   Patient was interested in improving communication skills between her and her .  Patient and her  both agreed that their marriage at this point is \"going along pretty good.\"  Tim rated his connection with his wife at 9 out of 10 with 10 being the closest.  Patient rated her connection with her  at 5-6 out of 10.  Both agreed that intellectually and spiritually they feel connected however the area that needs work is the emotional connection.  Patient reported she would like to share on the emotional level with her  however believes is not an option.  Franklin reported that he is perfectly willing however he does not feel comfortable taking the lead.  He also admitted he fears being criticized about things she might share with him.  \"I know I need to work on it.  I have tried to work on it to be more tender and kinder.\"  Patient shared \"I am definitely not going to bring things up but sometimes I have to make it a fight to get my point across.  Then Franklin denies things I bring up he does not want to take responsibility for his behavior.  He instantly has a negative attitude and then throws it back on me or denies it.\"  Cristobal GERD, I think it Anita's approach.  She wears her emotions on her's cuff and uses sarcasm and criticism.  I have tried to use love languages to show her.\"  \"Anita's very social and has excessive needs to talk and I do not want to spend that much time on it.  That I doubt she is really listing some of the time to things I say to her.  Anita has her mind locked in which is an obvious sign of her disinterest.\"  Sometimes I am just not interested.\"  " "Anita shared \"I want to know if the commitment is there.  Because of indifferences when you look at other women I am wondering are you there for me?  \"You told me that God is convicted and you do not want me to bring it up again.\"  Franklin responded \"I have taking care of it.\"  Patient replied, \"you traumatized me.\"  Franklin looked at his wife and made a verbal apology to her.  Patient brought up her 's tendency to give attention to other women especially when they are flirtatious.  He seems not to be aware that he is being sucked in by their attention.  Cristobal responded that he has become more aware of it and patient admits that this problem seems to have been in the past as they are not having any problems with [place] and enjoy their friends there.    Clinical Maneuvering/Intervention:  Assisted patient in processing above session content; acknowledged and normalized patient’s thoughts, feelings, and concerns.  Reviewed the 4 parts of intimacy, intellectual spiritual emotional and physical. Discussed that patient has not understood her 's apology to be sincere in how to calm her offering validation and reassurance.  Cristobal was asked to place himself in her position and asked how he would have handled it if a man moved into his seat after he stepped out and would not leave his wife's side.  Rachana responded that he would speak to the man directly.  Discussed that this might not be the best way to handle and it was surely not the way his wife felt comfortable handling it.  Endeavored to help him understand that his wife wanted him to handle it by ignoring and avoiding this woman and sending clear messages that he was not interested in her attention.  Patient agreed that this was her desire.  The couple agreed that this issue seems to have been dealt with and they will not need to bring it up again.  Shared a story of repentant cheater who found a way to " offer her reassurance to his wife daily following his affair ending.    Allowed patient to freely discuss issues without interruption or judgment. Provided safe, confidential environment to facilitate the development of positive therapeutic relationship and encourage open, honest communication. Assisted patient in identifying risk factors which would indicate the need for higher level of care including thoughts to harm self or others and/or self-harming behavior and encouraged patient to contact this office, call 911, or present to the nearest emergency room should any of these events occur. Discussed crisis intervention services and means to access.  Patient adamantly and convincingly denies current suicidal or homicidal ideation or perceptual disturbance.        Assessment     Patient is stable and grateful her  is willing to work on their marriage.    Diagnosis:   Encounter Diagnoses   Name Primary?   • Post traumatic stress disorder (PTSD) Yes   • Marital relationship problem    • Generalized anxiety disorder        Post traumatic stress disorder (PTSD) [F43.10]    Mental Status Exam  Hygiene:  good  Dress:  casual  Attitude:  Cooperative  Motor Activity:  Appropriate  Speech:  Normal  Mood:  within normal limits  Affect:  calm and pleasant  Thought Processes:  Goal directed and Linear  Thought Content:  normal  Suicidal Thoughts:  denies  Homicidal Thoughts:  denies  Crisis Safety Plan: yes, to come to the emergency room.  Hallucinations:  denies          Patient's Support Network Includes:  , daughter and Friends    Progress toward goal: Not at goal    Functional Status: No impairment    Prognosis: Good with Ongoing Treatment       Plan         Patient will adhere to medication regimen as prescribed and report any side effects. Patient will contact this office, call 911 or present to the nearest emergency room should suicidal or homicidal ideations occur. Provide Cognitive Behavioral Therapy and  Integrative Therapy to improve functioning, maintain stability, and avoid decompensation and the need for higher level of care.            Return if symptoms worsen or fail to improve.            This document electronically signed by Amaris Lloyd, MARCIO, NCC, CSAT  July 22, 2020 18:56    Plan

## 2020-08-15 DIAGNOSIS — F51.05 INSOMNIA DUE TO OTHER MENTAL DISORDER: ICD-10-CM

## 2020-08-15 DIAGNOSIS — F41.1 GENERALIZED ANXIETY DISORDER: ICD-10-CM

## 2020-08-15 DIAGNOSIS — F99 INSOMNIA DUE TO OTHER MENTAL DISORDER: ICD-10-CM

## 2020-08-15 DIAGNOSIS — F33.1 MAJOR DEPRESSIVE DISORDER, RECURRENT EPISODE, MODERATE (HCC): ICD-10-CM

## 2020-08-17 RX ORDER — FLUOXETINE HYDROCHLORIDE 40 MG/1
CAPSULE ORAL
Qty: 90 CAPSULE | Refills: 0 | OUTPATIENT
Start: 2020-08-17

## 2020-08-17 RX ORDER — TRAZODONE HYDROCHLORIDE 50 MG/1
25-50 TABLET ORAL NIGHTLY PRN
Qty: 90 TABLET | Refills: 0 | OUTPATIENT
Start: 2020-08-17

## 2020-10-13 ENCOUNTER — OFFICE VISIT (OUTPATIENT)
Dept: PSYCHIATRY | Facility: CLINIC | Age: 65
End: 2020-10-13

## 2020-10-13 VITALS
TEMPERATURE: 98 F | SYSTOLIC BLOOD PRESSURE: 128 MMHG | HEIGHT: 65 IN | HEART RATE: 84 BPM | DIASTOLIC BLOOD PRESSURE: 78 MMHG | WEIGHT: 174.2 LBS | BODY MASS INDEX: 29.02 KG/M2

## 2020-10-13 DIAGNOSIS — F33.0 MAJOR DEPRESSIVE DISORDER, RECURRENT EPISODE, MILD (HCC): ICD-10-CM

## 2020-10-13 DIAGNOSIS — Z79.899 MEDICATION MANAGEMENT: Primary | ICD-10-CM

## 2020-10-13 DIAGNOSIS — F41.1 GENERALIZED ANXIETY DISORDER: ICD-10-CM

## 2020-10-13 PROCEDURE — 99213 OFFICE O/P EST LOW 20 MIN: CPT | Performed by: NURSE PRACTITIONER

## 2020-10-13 RX ORDER — FLUOXETINE 10 MG/1
10 CAPSULE ORAL DAILY
Qty: 14 CAPSULE | Refills: 0 | Status: SHIPPED | OUTPATIENT
Start: 2020-10-13

## 2020-10-13 RX ORDER — CYANOCOBALAMIN, ISOPROPYL ALCOHOL 1000MCG/ML
1 KIT INJECTION
COMMUNITY
Start: 2020-04-03

## 2020-10-13 RX ORDER — DICLOFENAC SODIUM 75 MG/1
TABLET, DELAYED RELEASE ORAL
COMMUNITY

## 2020-10-13 RX ORDER — IPRATROPIUM BROMIDE 42 UG/1
SPRAY, METERED NASAL
COMMUNITY
Start: 2020-07-31

## 2020-10-13 NOTE — PROGRESS NOTES
"Subjective   Anita Gorman is a 65 y.o. female who presents today for follow up    Chief Complaint:  Follow-up for depression and anxiety    History of Present Illness: Patient reports her and her  has finished counseling with Amaris and she wishes to discontinue the Prozac at this time.  Patient reports in the summer she had decreased her Prozac from 40 mg daily to 20 mg daily in attempt to hopefully come off of it altogether as it has been 1 year since she began treatment.  She shares that she she has stopped the trazodone at night as it made her feel too \"droggy\" the next day and has switched to 10 mg melatonin agent and it has worked well for her.  Patient [place] their [place] together.  Patient reports appetite is \"too good\" as she had gained some weight over the summer patient reports going back on weight watchers as a diet plan for her and her .  Patient reports she is going to see her PCP for bloating and shares that she has been getting blisters in her mouth and on her tongue. Patient denies any side effects to the Prozac at this time.  Patient denies anxiety or depression at this time.  Patient denies SI/HI/AVH.     The following portions of the patient's history were reviewed and updated as appropriate: allergies, current medications, past family history, past medical history, past social history, past surgical history and problem list.      Past Medical History:  Past Medical History:   Diagnosis Date   • Depression          Past Surgical History:  Past Surgical History:   Procedure Laterality Date   • BREAST SURGERY      lump removal   • HYSTERECTOMY     • KNEE CARTILAGE SURGERY         Problem List:  There is no problem list on file for this patient.      Allergy:   No Known Allergies     Current Medications:   Current Outpatient Medications   Medication Sig Dispense Refill   • Cyanocobalamin (B-12 " "Compliance Injection) 1000 MCG/ML kit Inject 1 mL into the appropriate muscle as directed by prescriber.     • diclofenac (VOLTAREN) 75 MG EC tablet      • docusate calcium (SURFAK) 240 MG capsule Take 240 mg by mouth 2 (Two) Times a Day.     • esomeprazole (nexIUM) 20 MG capsule      • estradiol (MINIVELLE, VIVELLE-DOT) 0.05 MG/24HR patch      • fluorometholone (FML) 0.1 % ophthalmic suspension      • FLUoxetine (PROzac) 10 MG capsule Take 1 capsule by mouth Daily. 14 capsule 0   • ipratropium (ATROVENT) 0.06 % nasal spray      • Lidocaine HCl Urethral/Mucosal 2% (XYLOCAINE) 2 % gel lidocaine HCl 2 % mucosal jelly     • Loteprednol Etabonate (LOTEMAX) 0.5 % ointment Lotemax 0.5 % eye ointment     • melatonin 5 MG tablet tablet Take 5 mg by mouth.     • montelukast (SINGULAIR) 10 MG tablet      • phentermine (Adipex-P) 37.5 MG capsule Take 37.5 mg by mouth Every Morning.     • rosuvastatin (CRESTOR) 40 MG tablet      • sulfacetamide-prednisolone (BLEPHAMIDE) 10-0.2 % ophthalmic suspension Blephamide 10 %-0.2 % eye drops,suspension     • ibuprofen (ADVIL,MOTRIN) 600 MG tablet Take 600 mg by mouth Daily.     • sulfacetaminde-prednisolone (BLEPHAMIDE S.O.P.) 10-0.2 % ophthalmic ointment Blephamide S.O.P. 10 %-0.2 % eye ointment       No current facility-administered medications for this visit.        Review of Symptoms:    Review of Systems   Constitutional: Positive for unexpected weight gain. Negative for appetite change and fatigue.   Respiratory: Negative for shortness of breath.    Cardiovascular: Negative for chest pain and palpitations.   Neurological: Negative for confusion.   Psychiatric/Behavioral: Negative for decreased concentration, sleep disturbance, suicidal ideas and depressed mood. The patient is not nervous/anxious.          Physical Exam:   Blood pressure 128/78, pulse 84, temperature 98 °F (36.7 °C), height 165.1 cm (65\"), weight 79 kg (174 lb 3.2 oz).  Body mass index is 28.99 kg/m².    Appearance: " Well-dressed well-nourished and in no acute distress  Gait, Station, Strength: Within normal limits    Mental Status Exam:   Hygiene:   good  Cooperation:  Cooperative  Eye Contact:  Good  Psychomotor Behavior:  Appropriate  Affect:  Full range  Mood: normal  Hopelessness: Denies  Speech:  Normal  Thought Process:  Goal directed  Thought Content:  Normal  Suicidal:  None  Homicidal:  None  Hallucinations:  None  Delusion:  None  Memory:  Intact  Orientation:  Person, Place, Time and Situation  Reliability:  good  Insight:  Good  Judgement:  Good  Impulse Control:  Good  Physical/Medical Issues:  No      PHQ-Score Total:  PHQ-9 Total Score: 0      Lab Results:   Office Visit on 10/13/2020   Component Date Value Ref Range Status   • External Amphetamine Screen Urine 10/13/2020 Negative   Final   • Amphetamine Cut-Off 10/13/2020 1000ng/ml   Final   • External Benzodiazepine Screen Uri* 10/13/2020 Negative   Final   • Benzodiazipine Cut-Off 10/13/2020 300ng/ml   Final   • External Cocaine Screen Urine 10/13/2020 Negative   Final   • Cocaine Cut-Off 10/13/2020 300ng/ml   Final   • External THC Screen Urine 10/13/2020 Negative   Final   • THC Cut-Off 10/13/2020 50ng/ml   Final   • External Methadone Screen Urine 10/13/2020 Negative   Final   • Methadone Cut-Off 10/13/2020 300ng/ml   Final   • External Methamphetamine Screen Ur* 10/13/2020 Negative   Final   • Methamphetamine Cut-Off 10/13/2020 1000ng/ml   Final   • External Oxycodone Screen Urine 10/13/2020 Negative   Final   • Oxycodone Cut-Off 10/13/2020 100ng/ml   Final   • External Buprenorphine Screen Urine 10/13/2020 Negative   Final   • Buprenorphine Cut-Off 10/13/2020 10ng/ml   Final   • External MDMA 10/13/2020 Negative   Final   • MDMA Cut-Off 10/13/2020 500ng/ml   Final   • External Opiates Screen Urine 10/13/2020 Negative   Final   • Opiates Cut-Off 10/13/2020 300ng/ml   Final       Assessment/Plan   Diagnoses and all orders for this visit:    1. Medication  management (Primary)  -     [facility] Drug Screen    2. Major depressive disorder, recurrent episode, mild (CMS/HCC)  -     FLUoxetine (PROzac) 10 MG capsule; Take 1 capsule by mouth Daily.  Dispense: 14 capsule; Refill: 0    3. Generalized anxiety disorder  -     FLUoxetine (PROzac) 10 MG capsule; Take 1 capsule by mouth Daily.  Dispense: 14 capsule; Refill: 0        Visit Diagnoses:    ICD-10-CM ICD-9-CM   1. Medication management  Z79.899 V58.69   2. Major depressive disorder, recurrent episode, mild (CMS/HCC)  F33.0 296.31   3. Generalized anxiety disorder  F41.1 300.02       TREATMENT PLAN/GOALS:   -Discussed medication options and patient is adamant that she comes off of the Prozac as it has been over 1 year since beginning treatment.   -Decrease Prozac to 10 mg PO daily for the next 5 days and then stop medication.    -Encouraged patient to continue therapy, patient reports she feels she does not need therapy at this time.   -BEENA reviewed and appropriate. Patient counseled on use of controlled substances.   -UDS negative       Treatment and medication options discussed during today's visit. Patient acknowledged and verbally consented to continue with current treatment plan and was educated on the importance of compliance with treatment and follow-up appointments.      MEDICATION ISSUES:    Discussed medication options and treatment plan of prescribed medication as well as the risks, benefits, and side effects including potential falls, possible impaired driving and metabolic adversities among others. Patient is agreeable to call the office with any worsening of symptoms or onset of side effects. Patient is agreeable to call 911 or go to the nearest ER should he/she begin having SI/HI.     MEDS ORDERED DURING VISIT:  New Medications Ordered This Visit   Medications   • FLUoxetine (PROzac) 10 MG capsule     Sig: Take 1 capsule by mouth Daily.     Dispense:  14 capsule     Refill:  0       Patient states she  feels she does not need a follow-up at this time.  Encourage patient to call clinic if she has any worsening of symptoms or wishes to continue psychotherapy.             This document has been electronically signed by MANPREET Soria   October 13, 2020 09:18 EDT    Part of this note may be an electronic transcription/translation of spoken language to printed text using the Dragon Dictation System.

## 2020-11-10 ENCOUNTER — TRANSCRIBE ORDERS (OUTPATIENT)
Dept: ADMINISTRATIVE | Facility: HOSPITAL | Age: 65
End: 2020-11-10

## 2020-11-10 DIAGNOSIS — Z01.818 PRE-OPERATIVE CLEARANCE: Primary | ICD-10-CM

## 2020-11-11 ENCOUNTER — LAB (OUTPATIENT)
Dept: LAB | Facility: HOSPITAL | Age: 65
End: 2020-11-11

## 2020-11-11 DIAGNOSIS — Z01.818 PRE-OPERATIVE CLEARANCE: ICD-10-CM

## 2020-11-11 PROCEDURE — C9803 HOPD COVID-19 SPEC COLLECT: HCPCS

## 2020-11-11 PROCEDURE — U0004 COV-19 TEST NON-CDC HGH THRU: HCPCS | Performed by: INTERNAL MEDICINE

## 2020-11-12 LAB — SARS-COV-2 RNA RESP QL NAA+PROBE: NOT DETECTED

## 2021-02-22 DIAGNOSIS — Z23 IMMUNIZATION DUE: ICD-10-CM

## 2023-02-28 ENCOUNTER — TELEPHONE (OUTPATIENT)
Dept: GASTROENTEROLOGY | Facility: CLINIC | Age: 68
End: 2023-02-28
Payer: MEDICARE

## 2023-02-28 NOTE — TELEPHONE ENCOUNTER
I called patient to make sure she is aware, no answer. I left a message on her voicemail that she will be seeing Casandra on 3/16/2023 instead of Dr. Morrissey.

## 2023-02-28 NOTE — TELEPHONE ENCOUNTER
----- Message from Casandra Armendariz, APRN sent at 2/28/2023  1:59 PM EST -----  I am pre charting with Dr. De La Cruz. We noticed it said wanted to see Dr. De La Cruz prior to screening. Just wanted to make sure she was aware she is seeing me and not Ghanshyam. We assume they are told when rescheduled?   Thanks,   Deena

## 2023-03-16 ENCOUNTER — OFFICE VISIT (OUTPATIENT)
Dept: GASTROENTEROLOGY | Facility: CLINIC | Age: 68
End: 2023-03-16
Payer: MEDICARE

## 2023-03-16 VITALS
WEIGHT: 166 LBS | HEART RATE: 75 BPM | DIASTOLIC BLOOD PRESSURE: 78 MMHG | HEIGHT: 65 IN | SYSTOLIC BLOOD PRESSURE: 138 MMHG | BODY MASS INDEX: 27.66 KG/M2

## 2023-03-16 DIAGNOSIS — K21.9 GASTROESOPHAGEAL REFLUX DISEASE, UNSPECIFIED WHETHER ESOPHAGITIS PRESENT: ICD-10-CM

## 2023-03-16 DIAGNOSIS — K59.04 CHRONIC IDIOPATHIC CONSTIPATION: ICD-10-CM

## 2023-03-16 DIAGNOSIS — Z12.11 SCREENING FOR COLON CANCER: ICD-10-CM

## 2023-03-16 DIAGNOSIS — K63.5 POLYP OF TRANSVERSE COLON, UNSPECIFIED TYPE: Primary | ICD-10-CM

## 2023-03-16 PROCEDURE — 99213 OFFICE O/P EST LOW 20 MIN: CPT | Performed by: NURSE PRACTITIONER

## 2023-03-16 PROCEDURE — 1160F RVW MEDS BY RX/DR IN RCRD: CPT | Performed by: NURSE PRACTITIONER

## 2023-03-16 PROCEDURE — 1159F MED LIST DOCD IN RCRD: CPT | Performed by: NURSE PRACTITIONER

## 2023-03-16 RX ORDER — POLYETHYLENE GLYCOL 3350 17 G/17G
510 POWDER, FOR SOLUTION ORAL TAKE AS DIRECTED
Qty: 510 G | Refills: 0 | Status: SHIPPED | OUTPATIENT
Start: 2023-03-16

## 2023-03-16 RX ORDER — BISACODYL 5 MG/1
20 TABLET, DELAYED RELEASE ORAL ONCE
Qty: 4 TABLET | Refills: 0 | Status: SHIPPED | OUTPATIENT
Start: 2023-03-16 | End: 2023-03-16

## 2023-03-16 RX ORDER — HYDROCORTISONE 25 MG/G
CREAM TOPICAL 2 TIMES DAILY
Qty: 28.35 G | Refills: 2 | Status: SHIPPED | OUTPATIENT
Start: 2023-03-16

## 2023-04-11 PROBLEM — K59.04 CHRONIC IDIOPATHIC CONSTIPATION: Status: ACTIVE | Noted: 2023-04-11

## 2023-04-11 PROBLEM — K63.5 POLYP OF TRANSVERSE COLON: Status: ACTIVE | Noted: 2023-04-11

## 2023-04-27 ENCOUNTER — ANESTHESIA (OUTPATIENT)
Dept: PERIOP | Facility: HOSPITAL | Age: 68
End: 2023-04-27
Payer: MEDICARE

## 2023-04-27 ENCOUNTER — HOSPITAL ENCOUNTER (OUTPATIENT)
Facility: HOSPITAL | Age: 68
Setting detail: HOSPITAL OUTPATIENT SURGERY
Discharge: HOME | End: 2023-04-27
Attending: INTERNAL MEDICINE | Admitting: INTERNAL MEDICINE
Payer: MEDICARE

## 2023-04-27 ENCOUNTER — ANESTHESIA EVENT (OUTPATIENT)
Dept: PERIOP | Facility: HOSPITAL | Age: 68
End: 2023-04-27
Payer: MEDICARE

## 2023-04-27 VITALS
WEIGHT: 158 LBS | HEIGHT: 64 IN | OXYGEN SATURATION: 97 % | DIASTOLIC BLOOD PRESSURE: 72 MMHG | SYSTOLIC BLOOD PRESSURE: 121 MMHG | RESPIRATION RATE: 18 BRPM | BODY MASS INDEX: 26.98 KG/M2 | HEART RATE: 79 BPM | TEMPERATURE: 98.1 F

## 2023-04-27 DIAGNOSIS — K59.04 CHRONIC IDIOPATHIC CONSTIPATION: ICD-10-CM

## 2023-04-27 DIAGNOSIS — K63.5 POLYP OF TRANSVERSE COLON, UNSPECIFIED TYPE: ICD-10-CM

## 2023-04-27 PROCEDURE — 45385 COLONOSCOPY W/LESION REMOVAL: CPT | Performed by: INTERNAL MEDICINE

## 2023-04-27 PROCEDURE — 25010000002 FENTANYL CITRATE (PF) 50 MCG/ML SOLUTION: Performed by: NURSE ANESTHETIST, CERTIFIED REGISTERED

## 2023-04-27 PROCEDURE — 25010000002 PROPOFOL 10 MG/ML EMULSION: Performed by: NURSE ANESTHETIST, CERTIFIED REGISTERED

## 2023-04-27 PROCEDURE — 88305 TISSUE EXAM BY PATHOLOGIST: CPT

## 2023-04-27 RX ORDER — SODIUM CHLORIDE, SODIUM LACTATE, POTASSIUM CHLORIDE, CALCIUM CHLORIDE 600; 310; 30; 20 MG/100ML; MG/100ML; MG/100ML; MG/100ML
125 INJECTION, SOLUTION INTRAVENOUS ONCE
Status: COMPLETED | OUTPATIENT
Start: 2023-04-27 | End: 2023-04-27

## 2023-04-27 RX ORDER — SODIUM CHLORIDE 0.9 % (FLUSH) 0.9 %
10 SYRINGE (ML) INJECTION AS NEEDED
Status: DISCONTINUED | OUTPATIENT
Start: 2023-04-27 | End: 2023-04-27 | Stop reason: HOSPADM

## 2023-04-27 RX ORDER — SODIUM CHLORIDE, SODIUM LACTATE, POTASSIUM CHLORIDE, CALCIUM CHLORIDE 600; 310; 30; 20 MG/100ML; MG/100ML; MG/100ML; MG/100ML
100 INJECTION, SOLUTION INTRAVENOUS ONCE AS NEEDED
Status: DISCONTINUED | OUTPATIENT
Start: 2023-04-27 | End: 2023-04-27 | Stop reason: HOSPADM

## 2023-04-27 RX ORDER — SODIUM CHLORIDE 9 MG/ML
40 INJECTION, SOLUTION INTRAVENOUS AS NEEDED
Status: DISCONTINUED | OUTPATIENT
Start: 2023-04-27 | End: 2023-04-27 | Stop reason: HOSPADM

## 2023-04-27 RX ORDER — IPRATROPIUM BROMIDE AND ALBUTEROL SULFATE 2.5; .5 MG/3ML; MG/3ML
3 SOLUTION RESPIRATORY (INHALATION) ONCE AS NEEDED
Status: DISCONTINUED | OUTPATIENT
Start: 2023-04-27 | End: 2023-04-27 | Stop reason: HOSPADM

## 2023-04-27 RX ORDER — FENTANYL CITRATE 50 UG/ML
INJECTION, SOLUTION INTRAMUSCULAR; INTRAVENOUS AS NEEDED
Status: DISCONTINUED | OUTPATIENT
Start: 2023-04-27 | End: 2023-04-27 | Stop reason: SURG

## 2023-04-27 RX ORDER — MEPERIDINE HYDROCHLORIDE 25 MG/ML
12.5 INJECTION INTRAMUSCULAR; INTRAVENOUS; SUBCUTANEOUS
Status: DISCONTINUED | OUTPATIENT
Start: 2023-04-27 | End: 2023-04-27 | Stop reason: HOSPADM

## 2023-04-27 RX ORDER — ONDANSETRON 2 MG/ML
4 INJECTION INTRAMUSCULAR; INTRAVENOUS AS NEEDED
Status: DISCONTINUED | OUTPATIENT
Start: 2023-04-27 | End: 2023-04-27 | Stop reason: HOSPADM

## 2023-04-27 RX ORDER — OXYCODONE HYDROCHLORIDE AND ACETAMINOPHEN 5; 325 MG/1; MG/1
1 TABLET ORAL ONCE AS NEEDED
Status: DISCONTINUED | OUTPATIENT
Start: 2023-04-27 | End: 2023-04-27 | Stop reason: HOSPADM

## 2023-04-27 RX ORDER — PROPOFOL 10 MG/ML
VIAL (ML) INTRAVENOUS AS NEEDED
Status: DISCONTINUED | OUTPATIENT
Start: 2023-04-27 | End: 2023-04-27 | Stop reason: SURG

## 2023-04-27 RX ORDER — MIDAZOLAM HYDROCHLORIDE 1 MG/ML
0.5 INJECTION INTRAMUSCULAR; INTRAVENOUS
Status: DISCONTINUED | OUTPATIENT
Start: 2023-04-27 | End: 2023-04-27 | Stop reason: HOSPADM

## 2023-04-27 RX ORDER — SODIUM CHLORIDE 0.9 % (FLUSH) 0.9 %
10 SYRINGE (ML) INJECTION EVERY 12 HOURS SCHEDULED
Status: DISCONTINUED | OUTPATIENT
Start: 2023-04-27 | End: 2023-04-27 | Stop reason: HOSPADM

## 2023-04-27 RX ORDER — FENTANYL CITRATE 50 UG/ML
50 INJECTION, SOLUTION INTRAMUSCULAR; INTRAVENOUS
Status: DISCONTINUED | OUTPATIENT
Start: 2023-04-27 | End: 2023-04-27 | Stop reason: HOSPADM

## 2023-04-27 RX ADMIN — FENTANYL CITRATE 100 MCG: 50 INJECTION INTRAMUSCULAR; INTRAVENOUS at 10:13

## 2023-04-27 RX ADMIN — SODIUM CHLORIDE, POTASSIUM CHLORIDE, SODIUM LACTATE AND CALCIUM CHLORIDE: 600; 310; 30; 20 INJECTION, SOLUTION INTRAVENOUS at 10:09

## 2023-04-27 RX ADMIN — PROPOFOL 50 MG: 10 INJECTION, EMULSION INTRAVENOUS at 10:13

## 2023-04-27 RX ADMIN — PROPOFOL 160 MCG/KG/MIN: 10 INJECTION, EMULSION INTRAVENOUS at 10:14

## 2023-04-27 NOTE — ANESTHESIA PREPROCEDURE EVALUATION
Anesthesia Evaluation     Patient summary reviewed and Nursing notes reviewed   no history of anesthetic complications:  NPO Solid Status: > 8 hours  NPO Liquid Status: > 8 hours           Airway   Mallampati: II  TM distance: >3 FB  No difficulty expected  Dental - normal exam     Pulmonary - negative pulmonary ROS    breath sounds clear to auscultation  Cardiovascular   Exercise tolerance: good (4-7 METS)    Rhythm: regular  Rate: normal    (+) hyperlipidemia,       Neuro/Psych  (+) psychiatric history Anxiety,    GI/Hepatic/Renal/Endo    (+)  GERD,      Musculoskeletal     Abdominal     Abdomen: soft.   Substance History      OB/GYN negative ob/gyn ROS         Other   arthritis,                      Anesthesia Plan    ASA 2     general     intravenous induction     Anesthetic plan, risks, benefits, and alternatives have been provided, discussed and informed consent has been obtained with: patient.    Use of blood products discussed with consented to blood products.       CODE STATUS:

## 2023-04-27 NOTE — ANESTHESIA POSTPROCEDURE EVALUATION
Patient: Anita Gorman    Procedure Summary     Date: 04/27/23 Room / Location:  [facility]  /  [facility]    Anesthesia Start: 1009 Anesthesia Stop: 1033    Procedure: COLONOSCOPY Diagnosis:       Chronic idiopathic constipation      Polyp of transverse colon, unspecified type      (Chronic idiopathic constipation [K59.04])      (Polyp of transverse colon, unspecified type [K63.5])    Surgeons: Joyce Carrera MD Provider: Trace Mcnamara MD    Anesthesia Type: general ASA Status: 2          Anesthesia Type: general    Vitals  Vitals Value Taken Time   /72 04/27/23 1103   Temp 98.1 °F (36.7 °C) 04/27/23 1033   Pulse 79 04/27/23 1103   Resp 18 04/27/23 1103   SpO2 97 % 04/27/23 1103           Post Anesthesia Care and Evaluation    Patient location during evaluation: PACU  Patient participation: complete - patient participated  Level of consciousness: awake  Pain score: 0  Pain management: adequate    Airway patency: patent  Anesthetic complications: No anesthetic complications  PONV Status: none  Cardiovascular status: hemodynamically stable  Respiratory status: nasal cannula  Hydration status: acceptable

## 2023-04-27 NOTE — H&P
[facility]    Patient Identification:  Name:  Anita Gorman  Age:  68 y.o.  Sex:  female  :  1955  MRN:  [id number]   Visit Number:  [id number]  Primary Care Physician:  Jay Richards MD     Chief complaint: History of colon polyps, constipation    History of presenting illness: Ms. Gorman is a 68 y.o. female who presents today for colonoscopy. She reports having colonoscopy in  which was unremarkable. In , she reports having repeat colonoscopy with one benign polyp removed. Her most recent colonoscopy was in 2021 and she had one transverse colon polyp removed. Due to poor colon prep, repeat colonoscopy was recommended in 2 years by Dr. Mcdonald. She was given a two day colon prep. She has history of constipation which she says is controlled with colace HS and Miralax daily. She has no melena or BRBPR. Denies abdominal pain or unusual weight loss. Denies family history of colon cancer. She has no specific complaints at this time.      Review of Systems   Constitutional: Negative.    HENT: Negative.    Eyes: Negative.    Respiratory: Negative.    Cardiovascular: Negative.    Gastrointestinal: Negative for abdominal distention, abdominal pain, anal bleeding, blood in stool, constipation, diarrhea, nausea, rectal pain and vomiting.   Endocrine: Negative.    Genitourinary: Negative.    Musculoskeletal: Negative.    Skin: Negative.    Allergic/Immunologic: Negative.    Neurological: Negative.    Hematological: Negative.         Past Medical History:   Diagnosis Date   • Arthritis    • Depression    • Elevated cholesterol    • GERD (gastroesophageal reflux disease)    • Hypotension      Past Surgical History:   Procedure Laterality Date   • BREAST SURGERY      lump removal   • HYSTERECTOMY     • KNEE CARTILAGE SURGERY       History reviewed. No pertinent family history.  Social History     Socioeconomic History   • Marital status:   "  Tobacco Use   • Smoking status: Never   • Smokeless tobacco: Never   Vaping Use   • Vaping Use: Never used   Substance and Sexual Activity   • Alcohol use: Yes     Comment: social   • Drug use: No   • Sexual activity: Defer       Allergies:  Patient has no known allergies.    Prior to Admission Medications     Prescriptions Last Dose Informant Patient Reported? Taking?    Cyanocobalamin (B-12 Compliance Injection) 1000 MCG/ML kit   Yes No    Inject 1 mL into the appropriate muscle as directed by prescriber.    diclofenac (VOLTAREN) 75 MG EC tablet   Yes No    esomeprazole (nexIUM) 20 MG capsule   Yes No    fluorometholone (FML) 0.1 % ophthalmic suspension   Yes No    ipratropium (ATROVENT) 0.06 % nasal spray   Yes No    melatonin 5 MG tablet tablet   Yes No    Take 1 tablet by mouth.    montelukast (SINGULAIR) 10 MG tablet   Yes No    rosuvastatin (CRESTOR) 40 MG tablet   Yes No    Hydrocortisone, Perianal, (ANUSOL-HC) 2.5 % rectal cream   No No    Insert  into the rectum 2 (Two) Times a Day.    Lidocaine HCl Urethral/Mucosal 2% (XYLOCAINE) 2 % gel   Yes No    lidocaine HCl 2 % mucosal jelly    polyethylene glycol (MIRALAX) 17 GM/SCOOP powder   No No    Take 510 g by mouth Take As Directed. Place 15 cap fulls of powder in 32 oz of liquid of choice at 4:00 and 10:00 PM        Hospital Scheduled Meds:    No current facility-administered medications for this encounter.      Vital Signs:      Vitals:    04/27/23 0903   BP: 157/82   BP Location: Left arm   Patient Position: Lying   Pulse: 84   Resp: 18   Temp: 98.3 °F (36.8 °C)   TempSrc: Oral   SpO2: 97%   Weight: 71.7 kg (158 lb)   Height: 162.6 cm (64\")     Body mass index is 27.12 kg/m².    Physical Exam:  Constitutional:  Alert and oriented. Well developed and well nourished, in no acute distress.  HENT:  Head: Normocephalic and atraumatic.  Mouth:  Moist mucous membranes.  OP clear, mmm  Eyes:  Conjunctivae and EOM are normal.  Pupils are equal, round, and " reactive to light.  No scleral icterus.  Neck:  Neck supple.  No JVD present.    Cardiovascular:  RRR, no MRG.  Pulmonary/Chest:  CTAB, unlabored.   Abdominal:  Soft.  Bowel sounds are normal.  No distension and no tenderness.   Musculoskeletal:  No edema, no tenderness, and no deformity.   Neurological:  MS as above, grossly nonfocal exam     Assessment and Plan:  1. Proceed with colonoscopy given personal history of colon polyps and constipation.     Casandra Armendariz, MANPREET  04/27/23  08:52 EDT

## 2023-04-28 LAB — REF LAB TEST METHOD: NORMAL

## 2023-05-01 NOTE — PROGRESS NOTES
At the time of your recent colonoscopy, a polyp was removed from the colon.  The polyp was a hyperplastic polyp.  The hyperplastic polyps do not have potential for transformation into colon cancers.  Because of your overall history of colon polyps, you will need repeat colonoscopy in 5 years.  Thank you for allowing me to participate in your care. None

## 2023-06-08 ENCOUNTER — OFFICE VISIT (OUTPATIENT)
Dept: GASTROENTEROLOGY | Facility: CLINIC | Age: 68
End: 2023-06-08
Payer: MEDICARE

## 2023-06-08 VITALS
BODY MASS INDEX: 27.14 KG/M2 | SYSTOLIC BLOOD PRESSURE: 130 MMHG | HEART RATE: 90 BPM | WEIGHT: 159 LBS | DIASTOLIC BLOOD PRESSURE: 74 MMHG | HEIGHT: 64 IN

## 2023-06-08 DIAGNOSIS — K63.5 HYPERPLASTIC COLONIC POLYP, UNSPECIFIED PART OF COLON: ICD-10-CM

## 2023-06-08 DIAGNOSIS — K21.9 GASTROESOPHAGEAL REFLUX DISEASE, UNSPECIFIED WHETHER ESOPHAGITIS PRESENT: ICD-10-CM

## 2023-06-08 DIAGNOSIS — K59.00 CONSTIPATION, UNSPECIFIED CONSTIPATION TYPE: Primary | ICD-10-CM

## 2023-06-08 PROCEDURE — 99214 OFFICE O/P EST MOD 30 MIN: CPT | Performed by: NURSE PRACTITIONER

## 2023-06-08 PROCEDURE — 1160F RVW MEDS BY RX/DR IN RCRD: CPT | Performed by: NURSE PRACTITIONER

## 2023-06-08 PROCEDURE — 1159F MED LIST DOCD IN RCRD: CPT | Performed by: NURSE PRACTITIONER

## 2023-06-08 NOTE — PROGRESS NOTES
DATE:  6/8/2023    REASON FOR FOLLOW UP: Constipation, h/o colon polyps    REFERRING PHYSICIAN:  Jay Richards MD    CHIEF COMPLAINT:  Follow up of colonoscopy     HISTORY OF PRESENT ILLNESS:   Anita Sandra is a very pleasant 68 y.o. female who is being seen today at the request of Jay Richards MD for evaluation and treatment of constipation and history of colon polyps. Ms. Sandra reports following with Gastroenterology, Dr. Orona and Dr. Mcdonald in the past. She reports having colonoscopy in 2001 which was unremarkable. In 2014, she reports having repeat colonoscopy with one benign polyp removed. Her most recent colonoscopy was in November 2021 and she had one transverse colon polyp removed. Due to poor colon prep, repeat colonoscopy was recommended in 2 years by Dr. Mcdonald. She reports chronic difficulty with constipation. However, this has been managed well with colace at night and Miralax daily. She reports she typically has a bowel movement daily with her stool being a 4 on [place] stool scale. She denies having melena or BRBPR. Denies family history of colon cancer. Denies having abdominal pain or unusual weight loss. She also reports having GERD. However, this is well controlled with Nexium 20 mg PO daily. She says she will occasionally take Pepcid HS. Of note, she also says she had EGD in 2020.  Ms. Sandra says she knows she is due for repeat colonoscopy, but she does not like having them done. She requests medication for external hemorrhoids to use during colon prep. Patient is otherwise well and has no other complaints.     INTERVAL HISTORY:  Ms. Sandra presents today for follow up. Since her last visit, she underwent colonoscopy on 4/27/23 and had one benign polyp removed. Repeat colonoscopy was recommended in 5 years based on overall history of colon polyps. She says her bowels are moving daily with taking colace daily and benefiber with stool type 4-6. GERD is controlled with Nexium 20  mg PO daily. She says she is also avoiding spicy foods which make her symptoms worse. She has no new complaints today.      PAST MEDICAL HISTORY:  Past Medical History:   Diagnosis Date    Arthritis     Depression     Elevated cholesterol     GERD (gastroesophageal reflux disease)     Hypotension        PAST SURGICAL HISTORY:  Past Surgical History:   Procedure Laterality Date    BREAST SURGERY      lump removal    COLONOSCOPY      COLONOSCOPY N/A 4/27/2023    Procedure: COLONOSCOPY;  Surgeon: Joyce Carrera MD;  Location: [facility];  Service: Gastroenterology;  Laterality: N/A;    HYSTERECTOMY      KNEE CARTILAGE SURGERY         FAMILY HISTORY:  No family history on file.    SOCIAL HISTORY:  Social History     Socioeconomic History    Marital status:    Tobacco Use    Smoking status: Never    Smokeless tobacco: Never   Vaping Use    Vaping Use: Never used   Substance and Sexual Activity    Alcohol use: Yes     Comment: social    Drug use: No    Sexual activity: Defer       MEDICATIONS:  The current medication list was reviewed in the EMR    Current Outpatient Medications:     Cyanocobalamin (B-12 Compliance Injection) 1000 MCG/ML kit, Inject 1 mL into the appropriate muscle as directed by prescriber., Disp: , Rfl:     diclofenac (VOLTAREN) 75 MG EC tablet, , Disp: , Rfl:     esomeprazole (nexIUM) 20 MG capsule, , Disp: , Rfl:     fluorometholone (FML) 0.1 % ophthalmic suspension, , Disp: , Rfl:     Hydrocortisone, Perianal, (ANUSOL-HC) 2.5 % rectal cream, Insert  into the rectum 2 (Two) Times a Day., Disp: 28.35 g, Rfl: 2    ipratropium (ATROVENT) 0.06 % nasal spray, , Disp: , Rfl:     melatonin 5 MG tablet tablet, Take 1 tablet by mouth., Disp: , Rfl:     montelukast (SINGULAIR) 10 MG tablet, , Disp: , Rfl:     rosuvastatin (CRESTOR) 40 MG tablet, , Disp: , Rfl:     ALLERGIES:  No Known Allergies      REVIEW OF SYSTEMS:    A comprehensive 14 point review of systems was performed.  Significant  "findings as mentioned above.  All other systems reviewed and are negative.        Physical Exam   Vital Signs: /74   Pulse 90   Ht 162.6 cm (64\")   Wt 72.1 kg (159 lb)   BMI 27.29 kg/m²    General: Well developed, well nourished, alert and oriented x 3, in no acute distress.   Head: ATNC   Eyes: PERRL, No evidence of conjunctivitis.   Nose: No nasal discharge.   Mouth: Oral mucosal membranes moist. No oral ulceration or hemorrhages.   Neck: Neck supple. No thyromegaly. No JVD.   Lungs: CTAB, no wheezing  Heart:  Regular rate and rhythm. No murmurs, rubs, or gallops.   Abdomen: Soft. Bowel sounds are normoactive. Nontender with palpation.   Extremities: No cyanosis or edema.   Neurologic: Grossly non-focal exam     RECENT LABS:      ASSESSMENT & PLAN:  Anita Gorman is a very pleasant 68 y.o. female with    1.  Constipation:   2. History of colon polyps:  -Patient underwent repeat colonoscopy on 4/27/23 and had one benign polyp removed. Given overall history of colon polyps, repeat colonoscopy was recommended in 5 years. We discussed results and recommendations today.   -At present, her constipation is controlled with colace HS and benefiber daily. She was also advised to take Miralax prn.    3. GERD:  -Currently well controlled with Nexium 20 mg PO daily. She is also aware of dietary modifications to help control symptoms.     RTC as needed.        The patient was in agreement with the plan and all questions were answered to her satisfaction.     Thank you so much for allowing us to participate in the care of Anita Gorman . Please do not hesitate to contact us with any questions or concerns.             Electronically Signed by: Casandra Armendariz, MANPREET , June 8, 2023 15:02 EDT       CC:   Jay Richards MD         "

## 2023-08-04 ENCOUNTER — TRANSCRIBE ORDERS (OUTPATIENT)
Dept: ADMINISTRATIVE | Facility: HOSPITAL | Age: 68
End: 2023-08-04
Payer: MEDICARE

## 2023-08-04 DIAGNOSIS — Z78.0 ASYMPTOMATIC MENOPAUSAL STATE: Primary | ICD-10-CM

## 2023-08-14 ENCOUNTER — HOSPITAL ENCOUNTER (OUTPATIENT)
Dept: BONE DENSITY | Facility: HOSPITAL | Age: 68
Discharge: HOME | End: 2023-08-14
Admitting: INTERNAL MEDICINE
Payer: MEDICARE

## 2023-08-14 DIAGNOSIS — Z78.0 ASYMPTOMATIC MENOPAUSAL STATE: ICD-10-CM

## 2023-08-14 PROCEDURE — 77080 DXA BONE DENSITY AXIAL: CPT

## 2023-08-14 PROCEDURE — 77080 DXA BONE DENSITY AXIAL: CPT | Performed by: RADIOLOGY

## 2025-05-07 ENCOUNTER — LAB (OUTPATIENT)
Dept: LAB | Facility: HOSPITAL | Age: 70
End: 2025-05-07
Payer: MEDICARE

## 2025-05-07 ENCOUNTER — TRANSCRIBE ORDERS (OUTPATIENT)
Dept: LAB | Facility: HOSPITAL | Age: 70
End: 2025-05-07
Payer: MEDICARE

## 2025-05-07 DIAGNOSIS — Z78.0 MENOPAUSE: Primary | ICD-10-CM

## 2025-05-07 DIAGNOSIS — Z78.0 MENOPAUSE: ICD-10-CM

## 2025-05-07 LAB
25(OH)D3 SERPL-MCNC: 48.1 NG/ML (ref 30–100)
ALBUMIN SERPL-MCNC: 4.4 G/DL (ref 3.5–5.2)
ALBUMIN/GLOB SERPL: 1.6 G/DL
ALP SERPL-CCNC: 86 U/L (ref 39–117)
ALT SERPL W P-5'-P-CCNC: 19 U/L (ref 1–33)
ANION GAP SERPL CALCULATED.3IONS-SCNC: 11.9 MMOL/L (ref 5–15)
AST SERPL-CCNC: 19 U/L (ref 1–32)
BILIRUB SERPL-MCNC: 0.4 MG/DL (ref 0–1.2)
BUN SERPL-MCNC: 16 MG/DL (ref 8–23)
BUN/CREAT SERPL: 17 (ref 7–25)
CALCIUM SPEC-SCNC: 9.7 MG/DL (ref 8.6–10.5)
CHLORIDE SERPL-SCNC: 97 MMOL/L (ref 98–107)
CHOLEST SERPL-MCNC: 247 MG/DL (ref 0–200)
CO2 SERPL-SCNC: 27.1 MMOL/L (ref 22–29)
CREAT SERPL-MCNC: 0.94 MG/DL (ref 0.57–1)
DEPRECATED RDW RBC AUTO: 43.3 FL (ref 37–54)
EGFRCR SERPLBLD CKD-EPI 2021: 65 ML/MIN/1.73
ERYTHROCYTE [DISTWIDTH] IN BLOOD BY AUTOMATED COUNT: 13 % (ref 12.3–15.4)
GLOBULIN UR ELPH-MCNC: 2.7 GM/DL
GLUCOSE SERPL-MCNC: 116 MG/DL (ref 65–99)
HBA1C MFR BLD: 5.7 % (ref 4.8–5.6)
HCT VFR BLD AUTO: 40.1 % (ref 34–46.6)
HDLC SERPL QL: 2.94
HDLC SERPL-MCNC: 84 MG/DL (ref 40–60)
HGB BLD-MCNC: 13.3 G/DL (ref 12–15.9)
LDLC SERPL CALC-MCNC: 147 MG/DL (ref 0–100)
MCH RBC QN AUTO: 30.4 PG (ref 26.6–33)
MCHC RBC AUTO-ENTMCNC: 33.2 G/DL (ref 31.5–35.7)
MCV RBC AUTO: 91.6 FL (ref 79–97)
PLATELET # BLD AUTO: 355 10*3/MM3 (ref 140–450)
PMV BLD AUTO: 9.2 FL (ref 6–12)
POTASSIUM SERPL-SCNC: 4 MMOL/L (ref 3.5–5.2)
PROT SERPL-MCNC: 7.1 G/DL (ref 6–8.5)
RBC # BLD AUTO: 4.38 10*6/MM3 (ref 3.77–5.28)
SODIUM SERPL-SCNC: 136 MMOL/L (ref 136–145)
TRIGL SERPL-MCNC: 92 MG/DL (ref 0–150)
TSH SERPL DL<=0.05 MIU/L-ACNC: 0.79 UIU/ML (ref 0.27–4.2)
VLDLC SERPL-MCNC: 16 MG/DL (ref 5–40)
WBC NRBC COR # BLD AUTO: 5.86 10*3/MM3 (ref 3.4–10.8)

## 2025-05-07 PROCEDURE — 80061 LIPID PANEL: CPT

## 2025-05-07 PROCEDURE — 87086 URINE CULTURE/COLONY COUNT: CPT

## 2025-05-07 PROCEDURE — 80053 COMPREHEN METABOLIC PANEL: CPT

## 2025-05-07 PROCEDURE — 83036 HEMOGLOBIN GLYCOSYLATED A1C: CPT

## 2025-05-07 PROCEDURE — 84443 ASSAY THYROID STIM HORMONE: CPT

## 2025-05-07 PROCEDURE — 36415 COLL VENOUS BLD VENIPUNCTURE: CPT

## 2025-05-07 PROCEDURE — 82306 VITAMIN D 25 HYDROXY: CPT

## 2025-05-07 PROCEDURE — 85027 COMPLETE CBC AUTOMATED: CPT

## 2025-05-08 LAB — BACTERIA SPEC AEROBE CULT: NO GROWTH

## (undated) DEVICE — Device: Brand: DEFENDO AIR/WATER/SUCTION AND BIOPSY VALVE

## (undated) DEVICE — CONN Y IRR DISP 1P/U

## (undated) DEVICE — ENDOGATOR AUXILIARY WATER JET CONNECTOR: Brand: ENDOGATOR

## (undated) DEVICE — Device

## (undated) DEVICE — TUBING, SUCTION, 1/4" X 20', STRAIGHT: Brand: MEDLINE INDUSTRIES, INC.

## (undated) DEVICE — ENDOGATOR TUBING FOR ENDOGATOR EGP-100 IRRIGATION PUMP,OLYMPUS OFP PUMP, OLYMPUS AFU-100 PUMP AND ERBE EIP2 PUMP: Brand: ENDOGATOR

## (undated) DEVICE — 4-PORT MANIFOLD: Brand: NEPTUNE 2